# Patient Record
Sex: MALE | Race: WHITE | NOT HISPANIC OR LATINO | Employment: OTHER | URBAN - METROPOLITAN AREA
[De-identification: names, ages, dates, MRNs, and addresses within clinical notes are randomized per-mention and may not be internally consistent; named-entity substitution may affect disease eponyms.]

---

## 2017-01-04 ENCOUNTER — OFFICE VISIT - HEALTHEAST (OUTPATIENT)
Dept: PHYSICAL THERAPY | Facility: REHABILITATION | Age: 57
End: 2017-01-04

## 2017-01-04 DIAGNOSIS — G95.9 CERVICAL MYELOPATHY (H): ICD-10-CM

## 2017-01-04 DIAGNOSIS — M53.86 DECREASED ROM OF LUMBAR SPINE: ICD-10-CM

## 2017-01-04 DIAGNOSIS — M62.81 GENERALIZED MUSCLE WEAKNESS: ICD-10-CM

## 2017-01-04 DIAGNOSIS — R26.81 UNSTEADINESS ON FEET: ICD-10-CM

## 2017-01-04 DIAGNOSIS — M53.82 DECREASED ROM OF INTERVERTEBRAL DISCS OF CERVICAL SPINE: ICD-10-CM

## 2017-01-04 DIAGNOSIS — Z98.1 HISTORY OF FUSION OF CERVICAL SPINE: ICD-10-CM

## 2017-01-04 DIAGNOSIS — M54.12 CERVICAL RADICULOPATHY AT C8: ICD-10-CM

## 2017-01-04 DIAGNOSIS — M54.16 RIGHT LUMBAR RADICULOPATHY: ICD-10-CM

## 2017-01-04 DIAGNOSIS — Z74.09 DECREASED FUNCTIONAL MOBILITY AND ENDURANCE: ICD-10-CM

## 2017-01-06 ENCOUNTER — OFFICE VISIT - HEALTHEAST (OUTPATIENT)
Dept: PHYSICAL THERAPY | Facility: REHABILITATION | Age: 57
End: 2017-01-06

## 2017-01-06 DIAGNOSIS — M62.81 GENERALIZED MUSCLE WEAKNESS: ICD-10-CM

## 2017-01-06 DIAGNOSIS — Z98.1 HISTORY OF FUSION OF CERVICAL SPINE: ICD-10-CM

## 2017-01-06 DIAGNOSIS — Z74.09 DECREASED FUNCTIONAL MOBILITY AND ENDURANCE: ICD-10-CM

## 2017-01-06 DIAGNOSIS — M21.371 RIGHT FOOT DROP: ICD-10-CM

## 2017-01-06 DIAGNOSIS — M53.82 DECREASED ROM OF INTERVERTEBRAL DISCS OF CERVICAL SPINE: ICD-10-CM

## 2017-01-06 DIAGNOSIS — M53.86 DECREASED ROM OF LUMBAR SPINE: ICD-10-CM

## 2017-01-06 DIAGNOSIS — M54.16 RIGHT LUMBAR RADICULOPATHY: ICD-10-CM

## 2017-01-06 DIAGNOSIS — R26.81 UNSTEADINESS ON FEET: ICD-10-CM

## 2017-01-06 DIAGNOSIS — M54.12 CERVICAL RADICULOPATHY AT C8: ICD-10-CM

## 2017-01-06 DIAGNOSIS — G95.9 CERVICAL MYELOPATHY (H): ICD-10-CM

## 2017-01-10 ENCOUNTER — OFFICE VISIT - HEALTHEAST (OUTPATIENT)
Dept: PHYSICAL THERAPY | Facility: REHABILITATION | Age: 57
End: 2017-01-10

## 2017-01-10 DIAGNOSIS — G95.9 CERVICAL MYELOPATHY (H): ICD-10-CM

## 2017-01-10 DIAGNOSIS — R26.81 UNSTEADINESS ON FEET: ICD-10-CM

## 2017-01-10 DIAGNOSIS — M62.81 GENERALIZED MUSCLE WEAKNESS: ICD-10-CM

## 2017-01-10 DIAGNOSIS — M53.82 DECREASED ROM OF INTERVERTEBRAL DISCS OF CERVICAL SPINE: ICD-10-CM

## 2017-01-10 DIAGNOSIS — M21.371 RIGHT FOOT DROP: ICD-10-CM

## 2017-01-10 DIAGNOSIS — Z98.1 HISTORY OF FUSION OF CERVICAL SPINE: ICD-10-CM

## 2017-01-10 DIAGNOSIS — Z74.09 DECREASED FUNCTIONAL MOBILITY AND ENDURANCE: ICD-10-CM

## 2017-01-10 DIAGNOSIS — M54.16 RIGHT LUMBAR RADICULOPATHY: ICD-10-CM

## 2017-01-10 DIAGNOSIS — M53.86 DECREASED ROM OF LUMBAR SPINE: ICD-10-CM

## 2017-01-10 DIAGNOSIS — M54.12 CERVICAL RADICULOPATHY AT C8: ICD-10-CM

## 2017-01-11 ENCOUNTER — HOSPITAL ENCOUNTER (OUTPATIENT)
Dept: PHYSICAL MEDICINE AND REHAB | Facility: CLINIC | Age: 57
Discharge: HOME | End: 2017-01-11
Attending: PHYSICIAN ASSISTANT

## 2017-01-11 DIAGNOSIS — Z98.1 S/P CERVICAL SPINAL FUSION: ICD-10-CM

## 2017-01-11 DIAGNOSIS — M54.16 LUMBAR RADICULOPATHY: ICD-10-CM

## 2017-01-11 DIAGNOSIS — M54.12 CERVICAL RADICULITIS: ICD-10-CM

## 2017-01-11 RX ORDER — CELECOXIB 100 MG/1
100 CAPSULE ORAL 2 TIMES DAILY PRN
Status: SHIPPED | COMMUNITY
Start: 2017-01-02

## 2017-01-17 ENCOUNTER — OFFICE VISIT - HEALTHEAST (OUTPATIENT)
Dept: PHYSICAL THERAPY | Facility: REHABILITATION | Age: 57
End: 2017-01-17

## 2017-01-17 DIAGNOSIS — M54.16 RIGHT LUMBAR RADICULOPATHY: ICD-10-CM

## 2017-01-17 DIAGNOSIS — M62.81 GENERALIZED MUSCLE WEAKNESS: ICD-10-CM

## 2017-01-17 DIAGNOSIS — Z98.1 HISTORY OF FUSION OF CERVICAL SPINE: ICD-10-CM

## 2017-01-17 DIAGNOSIS — Z74.09 DECREASED FUNCTIONAL MOBILITY AND ENDURANCE: ICD-10-CM

## 2017-01-17 DIAGNOSIS — M53.86 DECREASED ROM OF LUMBAR SPINE: ICD-10-CM

## 2017-01-17 DIAGNOSIS — M21.371 RIGHT FOOT DROP: ICD-10-CM

## 2017-01-17 DIAGNOSIS — R26.81 UNSTEADINESS ON FEET: ICD-10-CM

## 2017-01-17 DIAGNOSIS — M53.82 DECREASED ROM OF INTERVERTEBRAL DISCS OF CERVICAL SPINE: ICD-10-CM

## 2017-01-17 DIAGNOSIS — G95.9 CERVICAL MYELOPATHY (H): ICD-10-CM

## 2017-01-17 DIAGNOSIS — M54.12 CERVICAL RADICULOPATHY AT C8: ICD-10-CM

## 2017-01-24 ENCOUNTER — AMBULATORY - HEALTHEAST (OUTPATIENT)
Dept: NEUROSURGERY | Facility: CLINIC | Age: 57
End: 2017-01-24

## 2017-01-24 ENCOUNTER — OFFICE VISIT - HEALTHEAST (OUTPATIENT)
Dept: PHYSICAL THERAPY | Facility: REHABILITATION | Age: 57
End: 2017-01-24

## 2017-01-24 ENCOUNTER — OFFICE VISIT - HEALTHEAST (OUTPATIENT)
Dept: NEUROSURGERY | Facility: CLINIC | Age: 57
End: 2017-01-24

## 2017-01-24 DIAGNOSIS — M53.82 DECREASED ROM OF INTERVERTEBRAL DISCS OF CERVICAL SPINE: ICD-10-CM

## 2017-01-24 DIAGNOSIS — G95.9 CERVICAL MYELOPATHY (H): ICD-10-CM

## 2017-01-24 DIAGNOSIS — M21.371 RIGHT FOOT DROP: ICD-10-CM

## 2017-01-24 DIAGNOSIS — M54.16 RIGHT LUMBAR RADICULOPATHY: ICD-10-CM

## 2017-01-24 DIAGNOSIS — R26.81 UNSTEADINESS ON FEET: ICD-10-CM

## 2017-01-24 DIAGNOSIS — M54.12 CERVICAL RADICULOPATHY AT C8: ICD-10-CM

## 2017-01-24 DIAGNOSIS — M53.86 DECREASED ROM OF LUMBAR SPINE: ICD-10-CM

## 2017-01-24 DIAGNOSIS — Z74.09 DECREASED FUNCTIONAL MOBILITY AND ENDURANCE: ICD-10-CM

## 2017-01-24 DIAGNOSIS — Z98.1 HISTORY OF FUSION OF CERVICAL SPINE: ICD-10-CM

## 2017-01-24 DIAGNOSIS — M62.81 GENERALIZED MUSCLE WEAKNESS: ICD-10-CM

## 2017-01-26 ENCOUNTER — HOSPITAL ENCOUNTER (OUTPATIENT)
Dept: MRI IMAGING | Facility: CLINIC | Age: 57
Discharge: HOME | End: 2017-01-26
Attending: NEUROLOGICAL SURGERY

## 2017-01-26 DIAGNOSIS — M54.16 RIGHT LUMBAR RADICULOPATHY: ICD-10-CM

## 2017-02-01 ENCOUNTER — AMBULATORY - HEALTHEAST (OUTPATIENT)
Dept: NEUROSURGERY | Facility: CLINIC | Age: 57
End: 2017-02-01

## 2017-02-07 ENCOUNTER — COMMUNICATION - HEALTHEAST (OUTPATIENT)
Dept: NEUROSURGERY | Facility: CLINIC | Age: 57
End: 2017-02-07

## 2017-02-10 ENCOUNTER — AMBULATORY - HEALTHEAST (OUTPATIENT)
Dept: NEUROSURGERY | Facility: CLINIC | Age: 57
End: 2017-02-10

## 2017-02-13 ENCOUNTER — AMBULATORY - HEALTHEAST (OUTPATIENT)
Dept: NEUROSURGERY | Facility: CLINIC | Age: 57
End: 2017-02-13

## 2017-02-13 ENCOUNTER — RECORDS - HEALTHEAST (OUTPATIENT)
Dept: ADMINISTRATIVE | Facility: OTHER | Age: 57
End: 2017-02-13

## 2017-02-14 ENCOUNTER — RECORDS - HEALTHEAST (OUTPATIENT)
Dept: ADMINISTRATIVE | Facility: OTHER | Age: 57
End: 2017-02-14

## 2017-02-14 RX ORDER — ACETAMINOPHEN 325 MG/1
325 TABLET ORAL EVERY 6 HOURS PRN
Status: SHIPPED | COMMUNITY
Start: 2017-02-14

## 2017-02-28 ENCOUNTER — COMMUNICATION - HEALTHEAST (OUTPATIENT)
Dept: NEUROSURGERY | Facility: CLINIC | Age: 57
End: 2017-02-28

## 2017-03-03 ENCOUNTER — AMBULATORY - HEALTHEAST (OUTPATIENT)
Dept: NEUROSURGERY | Facility: CLINIC | Age: 57
End: 2017-03-03

## 2017-03-07 ENCOUNTER — ANESTHESIA - HEALTHEAST (OUTPATIENT)
Dept: SURGERY | Facility: CLINIC | Age: 57
End: 2017-03-07

## 2017-03-08 ENCOUNTER — SURGERY - HEALTHEAST (OUTPATIENT)
Dept: SURGERY | Facility: CLINIC | Age: 57
End: 2017-03-08

## 2017-03-08 ENCOUNTER — AMBULATORY - HEALTHEAST (OUTPATIENT)
Dept: NEUROSURGERY | Facility: CLINIC | Age: 57
End: 2017-03-08

## 2017-03-08 ASSESSMENT — MIFFLIN-ST. JEOR: SCORE: 1621.03

## 2017-03-09 ENCOUNTER — COMMUNICATION - HEALTHEAST (OUTPATIENT)
Dept: NEUROSURGERY | Facility: CLINIC | Age: 57
End: 2017-03-09

## 2017-03-16 ENCOUNTER — AMBULATORY - HEALTHEAST (OUTPATIENT)
Dept: NEUROSURGERY | Facility: CLINIC | Age: 57
End: 2017-03-16

## 2017-03-16 DIAGNOSIS — Z51.89 VISIT FOR WOUND CHECK: ICD-10-CM

## 2017-04-21 ENCOUNTER — AMBULATORY - HEALTHEAST (OUTPATIENT)
Dept: NEUROSURGERY | Facility: CLINIC | Age: 57
End: 2017-04-21

## 2017-04-21 ENCOUNTER — OFFICE VISIT - HEALTHEAST (OUTPATIENT)
Dept: NEUROSURGERY | Facility: CLINIC | Age: 57
End: 2017-04-21

## 2017-04-21 DIAGNOSIS — M54.16 LUMBAR NERVE ROOT COMPRESSION: ICD-10-CM

## 2017-04-21 ASSESSMENT — MIFFLIN-ST. JEOR: SCORE: 1620.46

## 2017-04-26 ENCOUNTER — OFFICE VISIT - HEALTHEAST (OUTPATIENT)
Dept: PHYSICAL THERAPY | Facility: REHABILITATION | Age: 57
End: 2017-04-26

## 2017-04-26 DIAGNOSIS — M62.81 GENERALIZED MUSCLE WEAKNESS: ICD-10-CM

## 2017-04-26 DIAGNOSIS — R26.2 DIFFICULTY WALKING: ICD-10-CM

## 2017-04-26 DIAGNOSIS — R26.81 UNSTEADINESS ON FEET: ICD-10-CM

## 2017-04-26 DIAGNOSIS — Z98.890 STATUS POST LUMBAR SPINE OPERATION: ICD-10-CM

## 2017-05-01 ENCOUNTER — OFFICE VISIT - HEALTHEAST (OUTPATIENT)
Dept: PHYSICAL THERAPY | Facility: REHABILITATION | Age: 57
End: 2017-05-01

## 2017-05-01 DIAGNOSIS — M53.82 DECREASED ROM OF INTERVERTEBRAL DISCS OF CERVICAL SPINE: ICD-10-CM

## 2017-05-01 DIAGNOSIS — G95.9 CERVICAL MYELOPATHY (H): ICD-10-CM

## 2017-05-01 DIAGNOSIS — Z98.890 STATUS POST LUMBAR SPINE OPERATION: ICD-10-CM

## 2017-05-01 DIAGNOSIS — M54.12 CERVICAL RADICULOPATHY AT C8: ICD-10-CM

## 2017-05-01 DIAGNOSIS — M62.81 GENERALIZED MUSCLE WEAKNESS: ICD-10-CM

## 2017-05-01 DIAGNOSIS — Z98.1 HISTORY OF FUSION OF CERVICAL SPINE: ICD-10-CM

## 2017-05-01 DIAGNOSIS — R26.2 DIFFICULTY WALKING: ICD-10-CM

## 2017-05-01 DIAGNOSIS — M53.86 DECREASED ROM OF LUMBAR SPINE: ICD-10-CM

## 2017-05-01 DIAGNOSIS — M54.16 RIGHT LUMBAR RADICULOPATHY: ICD-10-CM

## 2017-05-01 DIAGNOSIS — Z74.09 DECREASED FUNCTIONAL MOBILITY AND ENDURANCE: ICD-10-CM

## 2017-05-01 DIAGNOSIS — R26.81 UNSTEADINESS ON FEET: ICD-10-CM

## 2017-05-01 DIAGNOSIS — M21.371 RIGHT FOOT DROP: ICD-10-CM

## 2017-05-04 ENCOUNTER — OFFICE VISIT - HEALTHEAST (OUTPATIENT)
Dept: PHYSICAL THERAPY | Facility: REHABILITATION | Age: 57
End: 2017-05-04

## 2017-05-04 DIAGNOSIS — M62.81 GENERALIZED MUSCLE WEAKNESS: ICD-10-CM

## 2017-05-04 DIAGNOSIS — M53.86 DECREASED ROM OF LUMBAR SPINE: ICD-10-CM

## 2017-05-04 DIAGNOSIS — Z74.09 DECREASED FUNCTIONAL MOBILITY AND ENDURANCE: ICD-10-CM

## 2017-05-04 DIAGNOSIS — Z98.1 HISTORY OF FUSION OF CERVICAL SPINE: ICD-10-CM

## 2017-05-04 DIAGNOSIS — R26.81 UNSTEADINESS ON FEET: ICD-10-CM

## 2017-05-04 DIAGNOSIS — G95.9 CERVICAL MYELOPATHY (H): ICD-10-CM

## 2017-05-04 DIAGNOSIS — M53.82 DECREASED ROM OF INTERVERTEBRAL DISCS OF CERVICAL SPINE: ICD-10-CM

## 2017-05-04 DIAGNOSIS — R26.2 DIFFICULTY WALKING: ICD-10-CM

## 2017-05-04 DIAGNOSIS — Z98.890 STATUS POST LUMBAR SPINE OPERATION: ICD-10-CM

## 2017-05-04 DIAGNOSIS — M54.12 CERVICAL RADICULOPATHY AT C8: ICD-10-CM

## 2017-05-08 ENCOUNTER — OFFICE VISIT - HEALTHEAST (OUTPATIENT)
Dept: PHYSICAL THERAPY | Facility: REHABILITATION | Age: 57
End: 2017-05-08

## 2017-05-08 DIAGNOSIS — M54.12 CERVICAL RADICULOPATHY AT C8: ICD-10-CM

## 2017-05-08 DIAGNOSIS — R26.81 UNSTEADINESS ON FEET: ICD-10-CM

## 2017-05-08 DIAGNOSIS — M53.86 DECREASED ROM OF LUMBAR SPINE: ICD-10-CM

## 2017-05-08 DIAGNOSIS — M62.81 GENERALIZED MUSCLE WEAKNESS: ICD-10-CM

## 2017-05-08 DIAGNOSIS — Z98.890 STATUS POST LUMBAR SPINE OPERATION: ICD-10-CM

## 2017-05-08 DIAGNOSIS — Z74.09 DECREASED FUNCTIONAL MOBILITY AND ENDURANCE: ICD-10-CM

## 2017-05-08 DIAGNOSIS — G95.9 CERVICAL MYELOPATHY (H): ICD-10-CM

## 2017-05-08 DIAGNOSIS — M53.82 DECREASED ROM OF INTERVERTEBRAL DISCS OF CERVICAL SPINE: ICD-10-CM

## 2017-05-08 DIAGNOSIS — R26.2 DIFFICULTY WALKING: ICD-10-CM

## 2017-05-08 DIAGNOSIS — Z98.1 HISTORY OF FUSION OF CERVICAL SPINE: ICD-10-CM

## 2017-05-15 ENCOUNTER — OFFICE VISIT - HEALTHEAST (OUTPATIENT)
Dept: PHYSICAL THERAPY | Facility: REHABILITATION | Age: 57
End: 2017-05-15

## 2017-05-15 DIAGNOSIS — Z98.1 HISTORY OF FUSION OF CERVICAL SPINE: ICD-10-CM

## 2017-05-15 DIAGNOSIS — M53.86 DECREASED ROM OF LUMBAR SPINE: ICD-10-CM

## 2017-05-15 DIAGNOSIS — R26.81 UNSTEADINESS ON FEET: ICD-10-CM

## 2017-05-15 DIAGNOSIS — M54.12 CERVICAL RADICULOPATHY AT C8: ICD-10-CM

## 2017-05-15 DIAGNOSIS — R26.2 DIFFICULTY WALKING: ICD-10-CM

## 2017-05-15 DIAGNOSIS — Z98.890 STATUS POST LUMBAR SPINE OPERATION: ICD-10-CM

## 2017-05-15 DIAGNOSIS — M62.81 GENERALIZED MUSCLE WEAKNESS: ICD-10-CM

## 2017-05-18 ENCOUNTER — OFFICE VISIT - HEALTHEAST (OUTPATIENT)
Dept: PHYSICAL THERAPY | Facility: REHABILITATION | Age: 57
End: 2017-05-18

## 2017-05-18 DIAGNOSIS — R26.81 UNSTEADINESS ON FEET: ICD-10-CM

## 2017-05-18 DIAGNOSIS — Z98.890 STATUS POST LUMBAR SPINE OPERATION: ICD-10-CM

## 2017-05-18 DIAGNOSIS — M53.82 DECREASED ROM OF INTERVERTEBRAL DISCS OF CERVICAL SPINE: ICD-10-CM

## 2017-05-18 DIAGNOSIS — M53.86 DECREASED ROM OF LUMBAR SPINE: ICD-10-CM

## 2017-05-18 DIAGNOSIS — Z98.1 HISTORY OF FUSION OF CERVICAL SPINE: ICD-10-CM

## 2017-05-18 DIAGNOSIS — Z74.09 DECREASED FUNCTIONAL MOBILITY AND ENDURANCE: ICD-10-CM

## 2017-05-18 DIAGNOSIS — M62.81 GENERALIZED MUSCLE WEAKNESS: ICD-10-CM

## 2017-05-18 DIAGNOSIS — M54.12 CERVICAL RADICULOPATHY AT C8: ICD-10-CM

## 2017-05-18 DIAGNOSIS — R26.2 DIFFICULTY WALKING: ICD-10-CM

## 2017-05-22 ENCOUNTER — OFFICE VISIT - HEALTHEAST (OUTPATIENT)
Dept: PHYSICAL THERAPY | Facility: REHABILITATION | Age: 57
End: 2017-05-22

## 2017-05-22 DIAGNOSIS — Z98.890 STATUS POST LUMBAR SPINE OPERATION: ICD-10-CM

## 2017-05-22 DIAGNOSIS — M53.82 DECREASED ROM OF INTERVERTEBRAL DISCS OF CERVICAL SPINE: ICD-10-CM

## 2017-05-22 DIAGNOSIS — R26.81 UNSTEADINESS ON FEET: ICD-10-CM

## 2017-05-22 DIAGNOSIS — M62.81 GENERALIZED MUSCLE WEAKNESS: ICD-10-CM

## 2017-05-22 DIAGNOSIS — M54.12 CERVICAL RADICULOPATHY AT C8: ICD-10-CM

## 2017-05-22 DIAGNOSIS — Z98.1 HISTORY OF FUSION OF CERVICAL SPINE: ICD-10-CM

## 2017-05-22 DIAGNOSIS — Z74.09 DECREASED FUNCTIONAL MOBILITY AND ENDURANCE: ICD-10-CM

## 2017-05-22 DIAGNOSIS — M53.86 DECREASED ROM OF LUMBAR SPINE: ICD-10-CM

## 2017-05-22 DIAGNOSIS — R26.2 DIFFICULTY WALKING: ICD-10-CM

## 2017-05-25 ENCOUNTER — OFFICE VISIT - HEALTHEAST (OUTPATIENT)
Dept: PHYSICAL THERAPY | Facility: REHABILITATION | Age: 57
End: 2017-05-25

## 2017-05-25 DIAGNOSIS — M53.86 DECREASED ROM OF LUMBAR SPINE: ICD-10-CM

## 2017-05-25 DIAGNOSIS — R26.81 UNSTEADINESS ON FEET: ICD-10-CM

## 2017-05-25 DIAGNOSIS — Z98.1 HISTORY OF FUSION OF CERVICAL SPINE: ICD-10-CM

## 2017-05-25 DIAGNOSIS — M62.81 GENERALIZED MUSCLE WEAKNESS: ICD-10-CM

## 2017-05-25 DIAGNOSIS — R26.2 DIFFICULTY WALKING: ICD-10-CM

## 2017-05-25 DIAGNOSIS — M54.12 CERVICAL RADICULOPATHY AT C8: ICD-10-CM

## 2017-05-25 DIAGNOSIS — Z98.890 STATUS POST LUMBAR SPINE OPERATION: ICD-10-CM

## 2017-05-25 DIAGNOSIS — Z74.09 DECREASED FUNCTIONAL MOBILITY AND ENDURANCE: ICD-10-CM

## 2017-05-25 DIAGNOSIS — M53.82 DECREASED ROM OF INTERVERTEBRAL DISCS OF CERVICAL SPINE: ICD-10-CM

## 2017-06-02 ENCOUNTER — OFFICE VISIT - HEALTHEAST (OUTPATIENT)
Dept: PHYSICAL THERAPY | Facility: REHABILITATION | Age: 57
End: 2017-06-02

## 2017-06-02 DIAGNOSIS — M53.86 DECREASED ROM OF LUMBAR SPINE: ICD-10-CM

## 2017-06-02 DIAGNOSIS — Z98.890 STATUS POST LUMBAR SPINE OPERATION: ICD-10-CM

## 2017-06-02 DIAGNOSIS — R26.81 UNSTEADINESS ON FEET: ICD-10-CM

## 2017-06-02 DIAGNOSIS — M62.81 GENERALIZED MUSCLE WEAKNESS: ICD-10-CM

## 2017-06-02 DIAGNOSIS — Z98.1 HISTORY OF FUSION OF CERVICAL SPINE: ICD-10-CM

## 2017-06-02 DIAGNOSIS — Z74.09 DECREASED FUNCTIONAL MOBILITY AND ENDURANCE: ICD-10-CM

## 2017-06-02 DIAGNOSIS — M54.12 CERVICAL RADICULOPATHY AT C8: ICD-10-CM

## 2017-06-02 DIAGNOSIS — R26.2 DIFFICULTY WALKING: ICD-10-CM

## 2017-06-05 ENCOUNTER — OFFICE VISIT - HEALTHEAST (OUTPATIENT)
Dept: PHYSICAL THERAPY | Facility: REHABILITATION | Age: 57
End: 2017-06-05

## 2017-06-05 DIAGNOSIS — M54.16 RIGHT LUMBAR RADICULOPATHY: ICD-10-CM

## 2017-06-05 DIAGNOSIS — Z98.890 STATUS POST LUMBAR SPINE OPERATION: ICD-10-CM

## 2017-06-05 DIAGNOSIS — M53.86 DECREASED ROM OF LUMBAR SPINE: ICD-10-CM

## 2017-06-05 DIAGNOSIS — R26.81 UNSTEADINESS ON FEET: ICD-10-CM

## 2017-06-05 DIAGNOSIS — Z98.1 HISTORY OF FUSION OF CERVICAL SPINE: ICD-10-CM

## 2017-06-05 DIAGNOSIS — M53.82 DECREASED ROM OF INTERVERTEBRAL DISCS OF CERVICAL SPINE: ICD-10-CM

## 2017-06-05 DIAGNOSIS — M62.81 GENERALIZED MUSCLE WEAKNESS: ICD-10-CM

## 2017-06-05 DIAGNOSIS — R26.2 DIFFICULTY WALKING: ICD-10-CM

## 2017-06-05 DIAGNOSIS — M54.12 CERVICAL RADICULOPATHY AT C8: ICD-10-CM

## 2017-06-05 DIAGNOSIS — Z74.09 DECREASED FUNCTIONAL MOBILITY AND ENDURANCE: ICD-10-CM

## 2017-06-08 ENCOUNTER — OFFICE VISIT - HEALTHEAST (OUTPATIENT)
Dept: PHYSICAL THERAPY | Facility: REHABILITATION | Age: 57
End: 2017-06-08

## 2017-06-08 DIAGNOSIS — M54.12 CERVICAL RADICULOPATHY AT C8: ICD-10-CM

## 2017-06-08 DIAGNOSIS — G95.9 CERVICAL MYELOPATHY (H): ICD-10-CM

## 2017-06-08 DIAGNOSIS — R26.81 UNSTEADINESS ON FEET: ICD-10-CM

## 2017-06-08 DIAGNOSIS — M62.81 GENERALIZED MUSCLE WEAKNESS: ICD-10-CM

## 2017-06-08 DIAGNOSIS — M54.16 RIGHT LUMBAR RADICULOPATHY: ICD-10-CM

## 2017-06-08 DIAGNOSIS — R26.2 DIFFICULTY WALKING: ICD-10-CM

## 2017-06-08 DIAGNOSIS — M53.82 DECREASED ROM OF INTERVERTEBRAL DISCS OF CERVICAL SPINE: ICD-10-CM

## 2017-06-08 DIAGNOSIS — Z74.09 DECREASED FUNCTIONAL MOBILITY AND ENDURANCE: ICD-10-CM

## 2017-06-08 DIAGNOSIS — M53.86 DECREASED ROM OF LUMBAR SPINE: ICD-10-CM

## 2017-06-08 DIAGNOSIS — Z98.1 HISTORY OF FUSION OF CERVICAL SPINE: ICD-10-CM

## 2017-06-08 DIAGNOSIS — Z98.890 STATUS POST LUMBAR SPINE OPERATION: ICD-10-CM

## 2017-06-12 ENCOUNTER — OFFICE VISIT - HEALTHEAST (OUTPATIENT)
Dept: PHYSICAL THERAPY | Facility: REHABILITATION | Age: 57
End: 2017-06-12

## 2017-06-12 DIAGNOSIS — Z98.890 STATUS POST LUMBAR SPINE OPERATION: ICD-10-CM

## 2017-06-12 DIAGNOSIS — M54.12 CERVICAL RADICULOPATHY AT C8: ICD-10-CM

## 2017-06-12 DIAGNOSIS — Z74.09 DECREASED FUNCTIONAL MOBILITY AND ENDURANCE: ICD-10-CM

## 2017-06-12 DIAGNOSIS — M53.82 DECREASED ROM OF INTERVERTEBRAL DISCS OF CERVICAL SPINE: ICD-10-CM

## 2017-06-12 DIAGNOSIS — R26.2 DIFFICULTY WALKING: ICD-10-CM

## 2017-06-12 DIAGNOSIS — R26.81 UNSTEADINESS ON FEET: ICD-10-CM

## 2017-06-12 DIAGNOSIS — M53.86 DECREASED ROM OF LUMBAR SPINE: ICD-10-CM

## 2017-06-12 DIAGNOSIS — M62.81 GENERALIZED MUSCLE WEAKNESS: ICD-10-CM

## 2017-06-12 DIAGNOSIS — Z98.1 HISTORY OF FUSION OF CERVICAL SPINE: ICD-10-CM

## 2017-06-14 ENCOUNTER — AMBULATORY - HEALTHEAST (OUTPATIENT)
Dept: NEUROSURGERY | Facility: CLINIC | Age: 57
End: 2017-06-14

## 2017-06-14 DIAGNOSIS — M48.062 NEUROGENIC CLAUDICATION DUE TO LUMBAR SPINAL STENOSIS: ICD-10-CM

## 2017-08-01 ENCOUNTER — COMMUNICATION - HEALTHEAST (OUTPATIENT)
Dept: PHYSICAL THERAPY | Facility: REHABILITATION | Age: 57
End: 2017-08-01

## 2017-08-31 ENCOUNTER — RECORDS - HEALTHEAST (OUTPATIENT)
Dept: ADMINISTRATIVE | Facility: OTHER | Age: 57
End: 2017-08-31

## 2017-09-05 ENCOUNTER — HOSPITAL ENCOUNTER (OUTPATIENT)
Dept: NUCLEAR MEDICINE | Facility: HOSPITAL | Age: 57
Discharge: HOME | End: 2017-09-05
Attending: ORTHOPAEDIC SURGERY

## 2017-09-05 DIAGNOSIS — M25.559 HIP PAIN: ICD-10-CM

## 2017-10-30 ENCOUNTER — RECORDS - HEALTHEAST (OUTPATIENT)
Dept: RADIOLOGY | Facility: CLINIC | Age: 57
End: 2017-10-30

## 2017-10-30 ENCOUNTER — RECORDS - HEALTHEAST (OUTPATIENT)
Dept: ADMINISTRATIVE | Facility: OTHER | Age: 57
End: 2017-10-30

## 2017-10-30 ENCOUNTER — AMBULATORY - HEALTHEAST (OUTPATIENT)
Dept: NEUROSURGERY | Facility: CLINIC | Age: 57
End: 2017-10-30

## 2018-01-08 ENCOUNTER — RECORDS - HEALTHEAST (OUTPATIENT)
Dept: ADMINISTRATIVE | Facility: OTHER | Age: 58
End: 2018-01-08

## 2018-01-16 ENCOUNTER — RECORDS - HEALTHEAST (OUTPATIENT)
Dept: ADMINISTRATIVE | Facility: OTHER | Age: 58
End: 2018-01-16

## 2018-01-19 ENCOUNTER — RECORDS - HEALTHEAST (OUTPATIENT)
Dept: ADMINISTRATIVE | Facility: OTHER | Age: 58
End: 2018-01-19

## 2018-03-23 ENCOUNTER — COMMUNICATION - HEALTHEAST (OUTPATIENT)
Dept: TELEHEALTH | Facility: CLINIC | Age: 58
End: 2018-03-23

## 2018-03-23 ENCOUNTER — OFFICE VISIT - HEALTHEAST (OUTPATIENT)
Dept: NEUROSURGERY | Facility: CLINIC | Age: 58
End: 2018-03-23

## 2018-03-23 DIAGNOSIS — M54.50 LOW BACK PAIN POTENTIALLY ASSOCIATED WITH RADICULOPATHY: ICD-10-CM

## 2018-04-11 ENCOUNTER — COMMUNICATION - HEALTHEAST (OUTPATIENT)
Dept: NEUROSURGERY | Facility: CLINIC | Age: 58
End: 2018-04-11

## 2018-05-03 ENCOUNTER — OFFICE VISIT - HEALTHEAST (OUTPATIENT)
Dept: NEUROSURGERY | Facility: CLINIC | Age: 58
End: 2018-05-03

## 2018-05-03 DIAGNOSIS — G89.29 CHRONIC HEADACHES: ICD-10-CM

## 2018-05-03 DIAGNOSIS — M54.2 NECK PAIN: ICD-10-CM

## 2018-05-03 DIAGNOSIS — R51.9 CHRONIC HEADACHES: ICD-10-CM

## 2018-05-03 RX ORDER — CHLORAL HYDRATE 500 MG
2 CAPSULE ORAL DAILY
Status: SHIPPED | COMMUNITY
Start: 2018-05-03

## 2018-05-03 ASSESSMENT — MIFFLIN-ST. JEOR: SCORE: 1620.46

## 2018-05-10 ENCOUNTER — HOSPITAL ENCOUNTER (OUTPATIENT)
Dept: PHYSICAL MEDICINE AND REHAB | Facility: CLINIC | Age: 58
Discharge: HOME | End: 2018-05-10
Attending: NEUROLOGICAL SURGERY

## 2018-05-10 DIAGNOSIS — R29.898 WEAKNESS OF RIGHT UPPER EXTREMITY: ICD-10-CM

## 2018-05-10 DIAGNOSIS — R51.9 HEADACHE: ICD-10-CM

## 2018-05-10 DIAGNOSIS — R42 DIZZINESS: ICD-10-CM

## 2018-05-10 DIAGNOSIS — M54.12 CERVICAL RADICULITIS: ICD-10-CM

## 2018-05-10 DIAGNOSIS — Z98.1 S/P CERVICAL SPINAL FUSION: ICD-10-CM

## 2018-05-10 DIAGNOSIS — F40.240 CLAUSTROPHOBIA: ICD-10-CM

## 2018-05-10 DIAGNOSIS — R20.0 RIGHT FACIAL NUMBNESS: ICD-10-CM

## 2018-05-10 RX ORDER — HYDROCODONE BITARTRATE AND ACETAMINOPHEN 5; 325 MG/1; MG/1
TABLET ORAL
Refills: 0 | Status: SHIPPED | COMMUNITY
Start: 2018-03-30

## 2018-05-10 RX ORDER — MAGNESIUM HYDROXIDE 400 MG/5ML
SUSPENSION, ORAL (FINAL DOSE FORM) ORAL
Status: SHIPPED | COMMUNITY
Start: 2018-04-26

## 2018-05-16 ENCOUNTER — HOSPITAL ENCOUNTER (OUTPATIENT)
Dept: MRI IMAGING | Facility: CLINIC | Age: 58
Discharge: HOME | End: 2018-05-16
Attending: PHYSICIAN ASSISTANT

## 2018-05-16 DIAGNOSIS — R20.0 RIGHT FACIAL NUMBNESS: ICD-10-CM

## 2018-05-16 DIAGNOSIS — R42 DIZZINESS: ICD-10-CM

## 2018-05-16 DIAGNOSIS — R29.898 WEAKNESS OF RIGHT UPPER EXTREMITY: ICD-10-CM

## 2018-05-16 DIAGNOSIS — R51.9 HEADACHE: ICD-10-CM

## 2018-05-17 ENCOUNTER — COMMUNICATION - HEALTHEAST (OUTPATIENT)
Dept: PHYSICAL MEDICINE AND REHAB | Facility: CLINIC | Age: 58
End: 2018-05-17

## 2018-05-17 DIAGNOSIS — M54.2 NECK PAIN: ICD-10-CM

## 2018-05-18 ENCOUNTER — HOSPITAL ENCOUNTER (OUTPATIENT)
Dept: PHYSICAL MEDICINE AND REHAB | Facility: CLINIC | Age: 58
Discharge: HOME | End: 2018-05-18
Attending: PHYSICIAN ASSISTANT

## 2018-05-18 DIAGNOSIS — M54.2 NECK PAIN: ICD-10-CM

## 2018-05-29 ENCOUNTER — AMBULATORY - HEALTHEAST (OUTPATIENT)
Dept: PHYSICAL MEDICINE AND REHAB | Facility: CLINIC | Age: 58
End: 2018-05-29

## 2018-05-29 DIAGNOSIS — M47.812 CERVICAL FACET JOINT SYNDROME: ICD-10-CM

## 2018-06-01 ENCOUNTER — HOSPITAL ENCOUNTER (OUTPATIENT)
Dept: PHYSICAL MEDICINE AND REHAB | Facility: CLINIC | Age: 58
Discharge: HOME | End: 2018-06-01
Attending: PHYSICIAN ASSISTANT

## 2018-06-01 DIAGNOSIS — M47.812 CERVICAL FACET JOINT SYNDROME: ICD-10-CM

## 2018-06-01 DIAGNOSIS — M12.88 OTHER SPECIFIC ARTHROPATHIES, NOT ELSEWHERE CLASSIFIED, OTHER SPECIFIED SITE: ICD-10-CM

## 2018-06-15 ENCOUNTER — HOSPITAL ENCOUNTER (OUTPATIENT)
Dept: PHYSICAL MEDICINE AND REHAB | Facility: CLINIC | Age: 58
Discharge: HOME | End: 2018-06-15
Attending: PHYSICIAN ASSISTANT

## 2018-06-15 DIAGNOSIS — M47.812 CERVICAL FACET JOINT SYNDROME: ICD-10-CM

## 2018-06-15 DIAGNOSIS — Z98.1 S/P CERVICAL SPINAL FUSION: ICD-10-CM

## 2018-06-15 DIAGNOSIS — M54.81 OCCIPITAL NEURALGIA OF RIGHT SIDE: ICD-10-CM

## 2018-06-15 DIAGNOSIS — M54.12 CERVICAL RADICULITIS: ICD-10-CM

## 2018-06-29 ENCOUNTER — HOSPITAL ENCOUNTER (OUTPATIENT)
Dept: PHYSICAL MEDICINE AND REHAB | Facility: CLINIC | Age: 58
Discharge: HOME | End: 2018-06-29
Attending: PHYSICIAN ASSISTANT

## 2018-06-29 DIAGNOSIS — M54.2 NECK PAIN: ICD-10-CM

## 2018-06-29 DIAGNOSIS — M47.812 CERVICAL FACET JOINT SYNDROME: ICD-10-CM

## 2018-06-29 DIAGNOSIS — M79.18 MYOFASCIAL PAIN: ICD-10-CM

## 2018-06-29 DIAGNOSIS — R20.0 RIGHT FACIAL NUMBNESS: ICD-10-CM

## 2018-06-29 DIAGNOSIS — Z98.1 S/P CERVICAL SPINAL FUSION: ICD-10-CM

## 2018-07-13 ENCOUNTER — HOSPITAL ENCOUNTER (OUTPATIENT)
Dept: PHYSICAL MEDICINE AND REHAB | Facility: CLINIC | Age: 58
Discharge: HOME | End: 2018-07-13
Attending: PHYSICIAN ASSISTANT

## 2018-07-13 DIAGNOSIS — Z98.1 S/P CERVICAL SPINAL FUSION: ICD-10-CM

## 2018-07-13 DIAGNOSIS — M79.18 MYOFASCIAL PAIN: ICD-10-CM

## 2018-07-13 DIAGNOSIS — M54.12 CERVICAL RADICULITIS: ICD-10-CM

## 2018-07-13 DIAGNOSIS — M47.812 CERVICAL FACET JOINT SYNDROME: ICD-10-CM

## 2018-07-13 DIAGNOSIS — M54.2 NECK PAIN: ICD-10-CM

## 2018-07-17 ENCOUNTER — RECORDS - HEALTHEAST (OUTPATIENT)
Dept: ADMINISTRATIVE | Facility: OTHER | Age: 58
End: 2018-07-17

## 2018-07-20 ENCOUNTER — OFFICE VISIT - HEALTHEAST (OUTPATIENT)
Dept: NEUROSURGERY | Facility: CLINIC | Age: 58
End: 2018-07-20

## 2018-07-20 DIAGNOSIS — M54.2 NECK PAIN: ICD-10-CM

## 2018-07-20 ASSESSMENT — MIFFLIN-ST. JEOR: SCORE: 1617.06

## 2018-07-23 ENCOUNTER — HOSPITAL ENCOUNTER (OUTPATIENT)
Dept: CT IMAGING | Facility: CLINIC | Age: 58
Discharge: HOME | End: 2018-07-23
Attending: SURGERY

## 2018-07-23 ENCOUNTER — HOSPITAL ENCOUNTER (OUTPATIENT)
Dept: RADIOLOGY | Facility: CLINIC | Age: 58
Discharge: HOME | End: 2018-07-23
Attending: SURGERY

## 2018-07-23 DIAGNOSIS — M54.2 NECK PAIN: ICD-10-CM

## 2018-08-08 ENCOUNTER — OFFICE VISIT - HEALTHEAST (OUTPATIENT)
Dept: NEUROSURGERY | Facility: CLINIC | Age: 58
End: 2018-08-08

## 2018-08-08 DIAGNOSIS — M48.02 NEURAL FORAMINAL STENOSIS OF CERVICAL SPINE: ICD-10-CM

## 2018-08-08 ASSESSMENT — MIFFLIN-ST. JEOR: SCORE: 1617.06

## 2018-08-09 ENCOUNTER — OFFICE VISIT - HEALTHEAST (OUTPATIENT)
Dept: PHYSICAL THERAPY | Facility: REHABILITATION | Age: 58
End: 2018-08-09

## 2018-08-09 DIAGNOSIS — R29.3 POOR POSTURE: ICD-10-CM

## 2018-08-09 DIAGNOSIS — M62.81 GENERALIZED MUSCLE WEAKNESS: ICD-10-CM

## 2018-08-09 DIAGNOSIS — M54.12 RIGHT CERVICAL RADICULOPATHY: ICD-10-CM

## 2018-08-15 ENCOUNTER — OFFICE VISIT - HEALTHEAST (OUTPATIENT)
Dept: PHYSICAL THERAPY | Facility: REHABILITATION | Age: 58
End: 2018-08-15

## 2018-08-15 DIAGNOSIS — R29.3 POOR POSTURE: ICD-10-CM

## 2018-08-15 DIAGNOSIS — R26.81 UNSTEADINESS ON FEET: ICD-10-CM

## 2018-08-15 DIAGNOSIS — M62.81 GENERALIZED MUSCLE WEAKNESS: ICD-10-CM

## 2018-08-15 DIAGNOSIS — R26.2 DIFFICULTY WALKING: ICD-10-CM

## 2018-08-15 DIAGNOSIS — Z98.890 STATUS POST LUMBAR SPINE OPERATION: ICD-10-CM

## 2018-08-15 DIAGNOSIS — M54.12 RIGHT CERVICAL RADICULOPATHY: ICD-10-CM

## 2018-08-15 DIAGNOSIS — M54.12 CERVICAL RADICULOPATHY AT C8: ICD-10-CM

## 2018-08-17 ENCOUNTER — OFFICE VISIT - HEALTHEAST (OUTPATIENT)
Dept: PHYSICAL THERAPY | Facility: REHABILITATION | Age: 58
End: 2018-08-17

## 2018-08-17 DIAGNOSIS — M62.81 GENERALIZED MUSCLE WEAKNESS: ICD-10-CM

## 2018-08-17 DIAGNOSIS — M54.12 RIGHT CERVICAL RADICULOPATHY: ICD-10-CM

## 2018-08-17 DIAGNOSIS — R29.3 POOR POSTURE: ICD-10-CM

## 2018-08-22 ENCOUNTER — OFFICE VISIT - HEALTHEAST (OUTPATIENT)
Dept: PHYSICAL THERAPY | Facility: REHABILITATION | Age: 58
End: 2018-08-22

## 2018-08-22 DIAGNOSIS — M54.12 RIGHT CERVICAL RADICULOPATHY: ICD-10-CM

## 2018-08-22 DIAGNOSIS — R29.3 POOR POSTURE: ICD-10-CM

## 2018-08-22 DIAGNOSIS — M62.81 GENERALIZED MUSCLE WEAKNESS: ICD-10-CM

## 2018-08-24 ENCOUNTER — OFFICE VISIT - HEALTHEAST (OUTPATIENT)
Dept: PHYSICAL THERAPY | Facility: REHABILITATION | Age: 58
End: 2018-08-24

## 2018-08-24 DIAGNOSIS — M54.12 RIGHT CERVICAL RADICULOPATHY: ICD-10-CM

## 2018-08-24 DIAGNOSIS — M62.81 GENERALIZED MUSCLE WEAKNESS: ICD-10-CM

## 2018-08-24 DIAGNOSIS — R29.3 POOR POSTURE: ICD-10-CM

## 2018-08-27 ENCOUNTER — HOSPITAL ENCOUNTER (OUTPATIENT)
Dept: PHYSICAL MEDICINE AND REHAB | Facility: CLINIC | Age: 58
Discharge: HOME | End: 2018-08-27
Attending: SURGERY

## 2018-08-27 ENCOUNTER — COMMUNICATION - HEALTHEAST (OUTPATIENT)
Dept: PHYSICAL MEDICINE AND REHAB | Facility: CLINIC | Age: 58
End: 2018-08-27

## 2018-08-27 DIAGNOSIS — M54.12 CERVICAL RADICULITIS: ICD-10-CM

## 2018-08-27 DIAGNOSIS — R51.9 HEADACHE: ICD-10-CM

## 2018-08-27 DIAGNOSIS — M47.812 CERVICAL FACET JOINT SYNDROME: ICD-10-CM

## 2018-08-27 DIAGNOSIS — M54.2 NECK PAIN: ICD-10-CM

## 2018-08-27 DIAGNOSIS — Z98.1 S/P CERVICAL SPINAL FUSION: ICD-10-CM

## 2018-08-27 RX ORDER — OXYCODONE AND ACETAMINOPHEN 5; 325 MG/1; MG/1
1 TABLET ORAL DAILY
Status: SHIPPED | COMMUNITY
Start: 2018-08-13

## 2018-08-29 ENCOUNTER — OFFICE VISIT - HEALTHEAST (OUTPATIENT)
Dept: PHYSICAL THERAPY | Facility: REHABILITATION | Age: 58
End: 2018-08-29

## 2018-08-29 DIAGNOSIS — Z98.1 HISTORY OF FUSION OF CERVICAL SPINE: ICD-10-CM

## 2018-08-29 DIAGNOSIS — R26.81 UNSTEADINESS ON FEET: ICD-10-CM

## 2018-08-29 DIAGNOSIS — Z98.890 STATUS POST LUMBAR SPINE OPERATION: ICD-10-CM

## 2018-08-29 DIAGNOSIS — M54.12 RIGHT CERVICAL RADICULOPATHY: ICD-10-CM

## 2018-08-29 DIAGNOSIS — R26.2 DIFFICULTY WALKING: ICD-10-CM

## 2018-08-29 DIAGNOSIS — R29.3 POOR POSTURE: ICD-10-CM

## 2018-08-29 DIAGNOSIS — M62.81 GENERALIZED MUSCLE WEAKNESS: ICD-10-CM

## 2018-08-29 DIAGNOSIS — M54.12 CERVICAL RADICULOPATHY AT C8: ICD-10-CM

## 2018-09-07 ENCOUNTER — HOSPITAL ENCOUNTER (OUTPATIENT)
Dept: PHYSICAL MEDICINE AND REHAB | Facility: CLINIC | Age: 58
Discharge: HOME | End: 2018-09-07
Attending: PHYSICIAN ASSISTANT

## 2018-09-07 DIAGNOSIS — M54.2 NECK PAIN: ICD-10-CM

## 2018-09-07 RX ORDER — ATORVASTATIN CALCIUM 20 MG/1
20 TABLET, FILM COATED ORAL
Status: SHIPPED | COMMUNITY
Start: 2018-09-05

## 2018-09-12 ENCOUNTER — OFFICE VISIT - HEALTHEAST (OUTPATIENT)
Dept: PHYSICAL THERAPY | Facility: REHABILITATION | Age: 58
End: 2018-09-12

## 2018-09-12 DIAGNOSIS — R29.3 POOR POSTURE: ICD-10-CM

## 2018-09-12 DIAGNOSIS — R26.2 DIFFICULTY WALKING: ICD-10-CM

## 2018-09-12 DIAGNOSIS — M54.12 RIGHT CERVICAL RADICULOPATHY: ICD-10-CM

## 2018-09-12 DIAGNOSIS — M62.81 GENERALIZED MUSCLE WEAKNESS: ICD-10-CM

## 2018-09-12 DIAGNOSIS — Z98.890 STATUS POST LUMBAR SPINE OPERATION: ICD-10-CM

## 2018-09-19 ENCOUNTER — OFFICE VISIT - HEALTHEAST (OUTPATIENT)
Dept: PHYSICAL THERAPY | Facility: REHABILITATION | Age: 58
End: 2018-09-19

## 2018-09-19 DIAGNOSIS — M62.81 GENERALIZED MUSCLE WEAKNESS: ICD-10-CM

## 2018-09-19 DIAGNOSIS — R29.3 POOR POSTURE: ICD-10-CM

## 2018-09-19 DIAGNOSIS — M54.12 RIGHT CERVICAL RADICULOPATHY: ICD-10-CM

## 2018-09-21 ENCOUNTER — HOSPITAL ENCOUNTER (OUTPATIENT)
Dept: PHYSICAL MEDICINE AND REHAB | Facility: CLINIC | Age: 58
Discharge: HOME | End: 2018-09-21
Attending: PHYSICIAN ASSISTANT

## 2018-09-21 DIAGNOSIS — M54.12 CERVICAL RADICULITIS: ICD-10-CM

## 2018-09-21 DIAGNOSIS — Z98.1 S/P CERVICAL SPINAL FUSION: ICD-10-CM

## 2021-05-28 ENCOUNTER — RECORDS - HEALTHEAST (OUTPATIENT)
Dept: ADMINISTRATIVE | Facility: CLINIC | Age: 61
End: 2021-05-28

## 2021-05-30 VITALS — WEIGHT: 188 LBS | BODY MASS INDEX: 27.76 KG/M2

## 2021-05-30 VITALS — WEIGHT: 189 LBS | WEIGHT: 189 LBS | BODY MASS INDEX: 27.91 KG/M2 | BODY MASS INDEX: 27.91 KG/M2

## 2021-05-30 VITALS — HEIGHT: 69 IN | BODY MASS INDEX: 27.11 KG/M2 | WEIGHT: 183 LBS

## 2021-05-30 VITALS — HEIGHT: 69 IN | WEIGHT: 183.13 LBS | BODY MASS INDEX: 27.12 KG/M2

## 2021-05-30 VITALS — BODY MASS INDEX: 27.76 KG/M2 | HEIGHT: 69 IN | BODY MASS INDEX: 27.76 KG/M2 | HEIGHT: 69 IN

## 2021-06-01 VITALS — WEIGHT: 192 LBS | BODY MASS INDEX: 28.35 KG/M2

## 2021-06-01 VITALS — BODY MASS INDEX: 28.35 KG/M2 | WEIGHT: 192 LBS

## 2021-06-01 VITALS — BODY MASS INDEX: 27.57 KG/M2 | WEIGHT: 184 LBS

## 2021-06-01 VITALS — HEIGHT: 69 IN | WEIGHT: 184 LBS | BODY MASS INDEX: 27.25 KG/M2

## 2021-06-01 VITALS — WEIGHT: 183 LBS | HEIGHT: 69 IN | BODY MASS INDEX: 27.11 KG/M2

## 2021-06-01 VITALS — BODY MASS INDEX: 27.17 KG/M2 | WEIGHT: 184 LBS

## 2021-06-01 VITALS — WEIGHT: 184 LBS | BODY MASS INDEX: 27.17 KG/M2

## 2021-06-02 VITALS — BODY MASS INDEX: 27.57 KG/M2 | WEIGHT: 184 LBS

## 2021-06-08 NOTE — PROGRESS NOTES
[facility] Daily Progress/Discharge summary     Patient Name: Liban Nava  Date: 2/7/2017  Visit #: 6  PTA visit #:    Referral Diagnosis: lumbar radiculopathy, cervical radiculitis, s/p cervical spinal fusion, backache  Referring provider: Gia Conn PA-C  Visit Diagnosis:     ICD-10-CM    1. Cervical myelopathy G95.9    2. Cervical radiculopathy at C8 M54.12    3. History of fusion of cervical spine Z98.1    4. Decreased ROM of lumbar spine M25.60    5. Generalized muscle weakness M62.81    6. Decreased ROM of intervertebral discs of cervical spine M53.82    7. Unsteadiness on feet R26.81    8. Decreased functional mobility and endurance Z74.09    9. Right lumbar radiculopathy M54.16    10. Right foot drop M21.371          Assessment:   Patient has made significant progress in PT with his neck pain and radicular symptoms into R UE. He has decreased pain and neural tension, which has increased in R UE and cervical mobility. However, he still has radicular symptoms into 4th-5th digits and neck pain that limits his ROM. He has worked with some nerve glides in his R LE, which have improved. However, his pain in his low back and into his leg remains unchanged. Patient is scheduled for a lumbar operation with Dr. Gutierrez on 2/15/17. Patient is now discharged from PT and will need a new order to resume in the future.    HEP/POC compliance is  good .  Patient demonstrates understanding/independence with home program.  Patient is benefitting from skilled physical therapy and is making steady progress toward functional goals.    Goal Status:  Pt. will demonstrate/verbalize independence in self-management of condition in : 6 weeks;Not Met (patient having surgery)  Pt. will be independent with home exercise program in : 2 weeks;Met  Patient will decrease : JAVIER score;by _ points;for improved quality of function;in 6 weeks;Not Met  by ___ points: 6  Pt will: have decreased NDI by 5 points in order to improve  "QOL within 8 weeks. (GOAL MET)  Pt will: have decreased radicular symptoms into R UE within 8 weeks in order to improve muscle strength. (GOAL PARTIALLY MET)  Pt will: have decreased radicular symptoms into R LE within 8 weeks in order to improve muscle strength. (GOAL NOT MET)    Plan / Patient Education:     Continue with initial plan of care.  Progress with home program as tolerated.    Exercises:  Exercise #1: R LE leg extended ankle pumps (nerve glides)  Comment #1: x20 (patient unable to lie supine for this exs)  Exercise #2: seated median nerve glides without head turn (45 degrees of abduction)  Comment #2: x20  Exercise #3: doorway pec stretch  Comment #3: 30\" holds  Exercise #4: R scalene and UT stretch without overpressure  Comment #4: 30\" holds each  Exercise #5: abd sets in sitting     Subjective:     Pain Ratin/10 low and leg. 4-5/10 in neck/arm    Injured low back this weekend while moving a toolbox in the back of son's car. Arm is getting much better. Seeing Dr. Gutierrez today. Had wife work on 1st rib at home which seems to really help. Compliant with HEP. Still having radicular symptoms into R UE. Feels that he's made about 40% progress in neck/R arm. LBP/leg pain feel the same.      Objective:   Using FWW into clinic today  R shoulder AROM:   Flexion- 95 degrees, limited by pain   Abduction- 90 degrees, limited by pain   IR- WFL  Cervical ext, SB, and rotation to the R increase neck and arm pain  Improved posture, but still poor  Tender R 1st rib mob  Positive radial and median neural tension  Significant improvement in R LE nerve glides; able to do full knee extension now  R cervical nerve glide to about 95 degrees with head turn  Modified Oswestry Low Back Pain Disablity Questionnaire  in %: 56  Neck Disability Score in %: 60    Treatment Today     TREATMENT MINUTES COMMENTS   Evaluation     Self-care/ Home management     Manual therapy 12 1st rib mob in seated position, Gr I-II. Showed patient " how to have his wife and/or children help with this at home.   Neuromuscular Re-education     Therapeutic Activity     Therapeutic Exercises 11 Discussed progress. Reviewed HEP. Objective measures taken    Gait training     Modality__________________                Total 23    Blank areas are intentional and mean the treatment did not include these items.       Cathy Brown, PT, DPT  2/7/2017

## 2021-06-08 NOTE — PROGRESS NOTES
"[facility] Daily Progress     Patient Name: Liban Nava  Date: 1/6/2017  Visit #: 3  PTA visit #:    Referral Diagnosis: lumbar radiculopathy, cervical radiculitis, s/p cervical spinal fusion, backache  Referring provider: Gia Conn PA-C  Visit Diagnosis:     ICD-10-CM    1. Cervical myelopathy G95.9    2. Cervical radiculopathy at C8 M54.12    3. History of fusion of cervical spine Z98.1    4. Generalized muscle weakness M62.81    5. Decreased ROM of lumbar spine M25.60    6. Decreased ROM of intervertebral discs of cervical spine M53.82    7. Unsteadiness on feet R26.81    8. Decreased functional mobility and endurance Z74.09    9. Right lumbar radiculopathy M54.16    10. Right foot drop M21.371          Assessment:     HEP/POC compliance is  good .  Patient demonstrates understanding/independence with home program.  Patient is benefitting from skilled physical therapy and is making steady progress toward functional goals.    Goal Status:  Pt. will demonstrate/verbalize independence in self-management of condition in : 6 weeks  Pt. will be independent with home exercise program in : 2 weeks  Patient will decrease : JAVIER score;by _ points;for improved quality of function;in 6 weeks  by ___ points: 6  Pt will: have decreased NDI by 5 points in order to improve QOL within 8 weeks.  Pt will: have decreased radicular symptoms into R UE within 8 weeks in order to improve muscle strength.  Pt will: have decreased radicular symptoms into R LE within 8 weeks in order to improve muscle strength.    Plan / Patient Education:     Continue with initial plan of care.  Progress with home program as tolerated.    Exercises:  Exercise #1: R LE leg extended ankle pumps (nerve glides)  Comment #1: x20 (patient unable to lie supine for this exs)  Exercise #2: seated median nerve glides without head turn (45 degrees of abduction)  Comment #2: x20  Exercise #3: doorway pec stretch  Comment #3: 30\" holds  Exercise " "#4: R scalene and UT stretch without overpressure  Comment #4: 30\" holds each  Exercise #5: abd sets in sitting     Plan for next visit: collect JAVIER, progress nerve glides if able. 1st rib mob, check HEP. gua sha tools to pec and R UT?  Subjective:     Pain Ratin /10 with use of tramadol    Thinks the stretches helped a little bit and provided some relief. Was able to sleep a little bit last night. Still having pain while turning head to the R. Able to progress nerve glides to about 80 degrees of abduction.       Objective:   Using FWW into clinic today  Poor posture  R shoulder protracted forward  Tender R 1st rib mob  Some change in parathesias     Treatment Today     TREATMENT MINUTES COMMENTS   Evaluation     Self-care/ Home management     Manual therapy 8 1st rib mob in seated position, Gr I-II   Neuromuscular Re-education     Therapeutic Activity     Therapeutic Exercises 15 Discussed progress. Progressed cervical and R LE nerve glides. Added abd set in sitting   Gait training     Modality__________________                Total 23    Blank areas are intentional and mean the treatment did not include these items.       Cathy Brown, PT, DPT  2017    "

## 2021-06-08 NOTE — PROGRESS NOTES
"[facility] Daily Progress     Patient Name: Liban Nava  Date: 1/10/2017  Visit #: 4  PTA visit #:    Referral Diagnosis: lumbar radiculopathy, cervical radiculitis, s/p cervical spinal fusion, backache  Referring provider: iGa Conn PA-C  Visit Diagnosis:     ICD-10-CM    1. Cervical myelopathy G95.9    2. Cervical radiculopathy at C8 M54.12    3. History of fusion of cervical spine Z98.1    4. Generalized muscle weakness M62.81    5. Decreased ROM of lumbar spine M25.60    6. Decreased ROM of intervertebral discs of cervical spine M53.82    7. Unsteadiness on feet R26.81    8. Decreased functional mobility and endurance Z74.09    9. Right lumbar radiculopathy M54.16    10. Right foot drop M21.371          Assessment:     HEP/POC compliance is  good .  Patient demonstrates understanding/independence with home program.  Patient is benefitting from skilled physical therapy and is making steady progress toward functional goals.    Goal Status:  Pt. will demonstrate/verbalize independence in self-management of condition in : 6 weeks  Pt. will be independent with home exercise program in : 2 weeks  Patient will decrease : JAVIER score;by _ points;for improved quality of function;in 6 weeks  by ___ points: 6  Pt will: have decreased NDI by 5 points in order to improve QOL within 8 weeks.  Pt will: have decreased radicular symptoms into R UE within 8 weeks in order to improve muscle strength.  Pt will: have decreased radicular symptoms into R LE within 8 weeks in order to improve muscle strength.    Plan / Patient Education:     Continue with initial plan of care.  Progress with home program as tolerated.    Exercises:  Exercise #1: R LE leg extended ankle pumps (nerve glides)  Comment #1: x20 (patient unable to lie supine for this exs)  Exercise #2: seated median nerve glides without head turn (45 degrees of abduction)  Comment #2: x20  Exercise #3: doorway pec stretch  Comment #3: 30\" " "holds  Exercise #4: R scalene and UT stretch without overpressure  Comment #4: 30\" holds each  Exercise #5: abd sets in sitting     Plan for next visit: progress nerve glides if able. 1st rib mob, check HEP. gua sha tools to pec and R UT?  Subjective:     Pain Ratin/10 with use of tramadol    Yesterday was pretty bad. Tough time sleeping over the weekend. Thinks that the increased pain today from sitting and waiting for his oil to get changed might be why he has increased pain. Isn't sure why he was flared up over the weekend. Doesn't think PT is the reason he has increased pain. The tingling into R UE is almost always constant. Feels about 30% better since beginning PT      Objective:   Using FWW into clinic today  Poor posture  R shoulder protracted forward  Tender R 1st rib mob  Positive radial and median neuro tension  Significant improvement in R LE nerve glides; able to do full knee extension now  R cervical nerve glide to about 80 degrees with slight head turn    Treatment Today     TREATMENT MINUTES COMMENTS   Evaluation     Self-care/ Home management     Manual therapy 8 1st rib mob in seated position, Gr I-II   Neuromuscular Re-education     Therapeutic Activity     Therapeutic Exercises 15 Discussed progress. Progressed cervical nerve glides with head turn. Added  Added radial nerve glides (passing ball around back) and radial nerve glides with R wrist in ext and slight cervical rotation to the R with shoulder extension   Gait training     Modality__________________                Total 23    Blank areas are intentional and mean the treatment did not include these items.       Cathy Brown, PT, DPT  1/10/2017    "

## 2021-06-08 NOTE — PROGRESS NOTES
"Chief complaint: Low back and right leg pain with weakness    HPI:  Pain: low back pain  Radicular Pain is present: from right buttock, down the back of right leg to knee, down the lateral lower leg to foot along Y interpret to be L5.  Motor complaints: right leg is weak  Sensory complaints: tingling in right leg  Gait and balance issues: gait and balance disturbance, uses walker  Bowel or bladder issues: denies  Duration of SX is: 1 year +  The symptoms are worse with: sitting, walking, standing  The symptoms are better with: laying down, ice  Injury: no  Severity is: moderate  Patient has tried the following conservative measures: PT pool tx, injection 12/2016 with minimal relief    Past Medical History   Diagnosis Date     Back pain with radiation      to legs     BPH (benign prostatic hyperplasia)      Cervical spondylosis      Degenerative disc disease, lumbar      Degenerative joint disease      GERD (gastroesophageal reflux disease)      Hypertension      not currently on meds     Lumbar spondylosis      Past Surgical History   Procedure Laterality Date     Cervical fusion  2000, 2004, 2014     Hernia repair       Appendectomy       Cholecystectomy       Nose surgery       polypectomy     Leg surgery Bilateral      to correct \"bowed legs\"     C3-6 laminoplasty       Sinus surgery       Lumbar laminectomy Bilateral 6/12/2014     L3 L4 laminectomy for stenosis, bilateral L34 medial facetectomy for lateral recess decompression, and bilateral L34 foraminotomy on 6/12/2014 by Dr. Sabrina Gutierrez.     Laminoplasty Bilateral 5/19/2014     C3-5 laminoplasty with C6 partial laminectomy on 5/19/2014 by Dr. Sabrina Gutierrez for cervical myeloplathy     Current Outpatient Prescriptions on File Prior to Visit   Medication Sig Dispense Refill     ASCORBATE CALCIUM (VITAMIN C ORAL) Take 500 mg by mouth daily.        aspirin 81 MG tablet Take 81 mg by mouth daily.       calcium carbonate (OS-DANELLE) 600 mg (1,500 mg) tablet Take " 600 mg by mouth daily.       celecoxib (CELEBREX) 100 MG capsule Take by mouth.       cyclobenzaprine (FLEXERIL) 10 MG tablet Take 10 mg by mouth 3 (three) times a day as needed for muscle spasms.       gabapentin (NEURONTIN) 300 MG capsule Take 300 mg by mouth 3 (three) times a day.       methocarbamol (ROBAXIN) 500 MG tablet Take 500 mg by mouth 3 (three) times a day as needed.       MULTIVITAMIN (MULTIPLE VITAMIN ORAL) Take 1 tablet by mouth daily.       omega-3 fatty acids-vitamin E (FISH OIL) 1,000 mg cap Take 1 capsule by mouth.       ranitidine (ZANTAC) 150 MG tablet Take 150 mg by mouth daily.       TAMSULOSIN HCL (TAMSULOSIN ORAL) Take 0.4 mg by mouth daily.       traMADol (ULTRAM) 50 mg tablet Take 50 mg by mouth.       oxyCODONE (ROXICODONE) 5 MG immediate release tablet Take 5-10 mg by mouth.       No current facility-administered medications on file prior to visit.      Allergies:  Cat dander    12 point review of systems is positive for recent hip surgery.  He still feels a bit unsteady on his feet.  Personal history of arthritis.  The neurological review as above.  Otherwise, the review is negative.    No pertinent family history.    Exam:  Visit Vitals     BP (!) 169/94     Pulse 75     Resp 20         Mental status: Alert and oriented, mood and affect appropriate, language reception and expression normal, recent and remote memory is normal, higher cortical function normal. Attention span, concentration and ability to follow commands is normal.    Nerves II through XII are grossly intact.    Motor with weakness of the right tibialis anterior.  Sensory changes in the distal right L5    Lumbar MRI shows lateral recess stenosis at L4-5 and foraminal stenosis at L5-S1 on the right side consistent with his clinical syndrome.    Assessment: Intractable right L5 radiculopathy with motor and sensory findings.    Plan: I have recommended decompression of the right L5 nerve root.  I reviewed the risk of  infection, bleeding and persistent nerve pain or worsening.  I also needed new MRI because the last one is 8 months old.  He appeared to understand the discussion and would like to proceed.    Sabrina Gutierrez MD, FACS, FAANS    Cc Roman Ramos MD

## 2021-06-08 NOTE — PROGRESS NOTES
"[facility] Daily Progress     Patient Name: Liban Nava  Date: 1/5/2017  Visit #: 2  PTA visit #:    Referral Diagnosis: lumbar radiculopathy, cervical radiculitis, s/p cervical spinal fusion, backache  Referring provider: Gia Conn PA-C  Visit Diagnosis:     ICD-10-CM    1. Cervical myelopathy G95.9    2. Cervical radiculopathy at C8 M54.12    3. History of fusion of cervical spine Z98.1    4. Generalized muscle weakness M62.81    5. Decreased ROM of lumbar spine M25.60    6. Decreased ROM of intervertebral discs of cervical spine M53.82    7. Unsteadiness on feet R26.81    8. Decreased functional mobility and endurance Z74.09    9. Right lumbar radiculopathy M54.16          Assessment:     HEP/POC compliance is  good .  Patient demonstrates understanding/independence with home program.  Patient is benefitting from skilled physical therapy and is making steady progress toward functional goals.    Goal Status:  Pt. will demonstrate/verbalize independence in self-management of condition in : 6 weeks  Pt. will be independent with home exercise program in : 2 weeks  Patient will decrease : JAVIER score;by _ points;for improved quality of function;in 6 weeks  by ___ points: 6  Pt will: have decreased NDI by 5 points in order to improve QOL within 8 weeks.  Pt will: have decreased radicular symptoms into R UE within 8 weeks in order to improve muscle strength.  Pt will: have decreased radicular symptoms into R LE within 8 weeks in order to improve muscle strength.    Plan / Patient Education:     Continue with initial plan of care.  Progress with home program as tolerated.    Exercises:  Exercise #1: R LE leg extended ankle pumps (nerve glides)  Comment #1: x20 (patient unable to lie supine for this exs)  Exercise #2: seated median nerve glides without head turn (45 degrees of abduction)  Comment #2: x20  Exercise #3: doorway pec stretch  Comment #3: 30\" holds  Exercise #4: R scalene and UT stretch " "without overpressure  Comment #4: 30\" holds each     Plan for next visit: collect JAVIER, progress nerve glides if able. 1st rib mob, check HEP. glute and piriformis stretches  Subjective:     Pain Ratin /10 with use of tramadol    Compliant with HEP. Did use ice and tried eliminating heat. Ice makes it feel stiff. Pain is overall the same in his back and his neck.       Objective:     Poor posture  R shoulder protracted forward  Tender R 1st rib mob  Some change in parathesias     Treatment Today     TREATMENT MINUTES COMMENTS   Evaluation     Self-care/ Home management     Manual therapy 8 1st rib mob in seated position, Gr I-II   Neuromuscular Re-education     Therapeutic Activity     Therapeutic Exercises 17 Discussed progress. Added scalene and UT stretches. Reviewed nerve glides; not ready to progress yet due to pain. Recommend use of FWW until recovered from low back surgery.   Gait training     Modality__________________                Total 25    Blank areas are intentional and mean the treatment did not include these items.       Cathy Brown, PT, DPT  2017    "

## 2021-06-08 NOTE — PROGRESS NOTES
[facility] Daily Progress     Patient Name: Liban Nava  Date: 1/17/2017  Visit #: 5  PTA visit #:    Referral Diagnosis: lumbar radiculopathy, cervical radiculitis, s/p cervical spinal fusion, backache  Referring provider: Gia Conn PA-C  Visit Diagnosis:     ICD-10-CM    1. Cervical myelopathy G95.9    2. Cervical radiculopathy at C8 M54.12    3. History of fusion of cervical spine Z98.1    4. Generalized muscle weakness M62.81    5. Decreased ROM of lumbar spine M25.60    6. Decreased ROM of intervertebral discs of cervical spine M53.82    7. Unsteadiness on feet R26.81    8. Decreased functional mobility and endurance Z74.09    9. Right lumbar radiculopathy M54.16    10. Right foot drop M21.371          Assessment:   Patient is making progress in therapy towards his goals. He is able to perform UE and LE nerve glides much easier now. He is also able to move his head better and is having less neural tension. The symptoms into his R UE are intermittent rather than constant. Patient is compliant and motivated which is why he is progressing as well as he is.    HEP/POC compliance is  good .  Patient demonstrates understanding/independence with home program.  Patient is benefitting from skilled physical therapy and is making steady progress toward functional goals.    Goal Status:  Pt. will demonstrate/verbalize independence in self-management of condition in : 6 weeks  Pt. will be independent with home exercise program in : 2 weeks  Patient will decrease : JAVIER score;by _ points;for improved quality of function;in 6 weeks  by ___ points: 6  Pt will: have decreased NDI by 5 points in order to improve QOL within 8 weeks.  Pt will: have decreased radicular symptoms into R UE within 8 weeks in order to improve muscle strength.  Pt will: have decreased radicular symptoms into R LE within 8 weeks in order to improve muscle strength.    Plan / Patient Education:     Continue with initial plan of  "care.  Progress with home program as tolerated.    Exercises:  Exercise #1: R LE leg extended ankle pumps (nerve glides)  Comment #1: x20 (patient unable to lie supine for this exs)  Exercise #2: seated median nerve glides without head turn (45 degrees of abduction)  Comment #2: x20  Exercise #3: doorway pec stretch  Comment #3: 30\" holds  Exercise #4: R scalene and UT stretch without overpressure  Comment #4: 30\" holds each  Exercise #5: abd sets in sitting     Plan for next visit: progress nerve glides if able. 1st rib mob, check HEP. eva sha tools to pec and R UT?  Subjective:     Pain Ratin/10 low and leg. 4/10 in neck     Seeing Dr. Gutierrez on 16. Thinks he's doing better. Gia thinks that he'll probably need surgery for his low back. Compliant with HEP. Laying on R side at night still bothersome. Tingling into R hand is now intermittent. Notices tingling when he tries to use R UE        Objective:   Using FWW into clinic today  R shoulder AROM:   Flexion- 110 degrees, limited by pain   Abduction- 110 degrees, limited by pain   IR- WFL  Improved posture, but still poor  Tender R 1st rib mob  Positive radial and median neural tension  Significant improvement in R LE nerve glides; able to do full knee extension now  R cervical nerve glide to about 95 degrees with head turn    Treatment Today     TREATMENT MINUTES COMMENTS   Evaluation     Self-care/ Home management     Manual therapy 10 1st rib mob in seated position, Gr I-II. Showed patient how to have his wife and/or children help with this at home.   Neuromuscular Re-education     Therapeutic Activity     Therapeutic Exercises 10 Discussed progress. Progressed cervical nerve glides with head turn.    Gait training     Modality__________________                Total 20    Blank areas are intentional and mean the treatment did not include these items.       Cathy Brown, PT, DPT  2017    "

## 2021-06-08 NOTE — PROGRESS NOTES
Assessment:   Liban Nava is a 56 y.o. y.o. male with past medical history significant for GERD, hypertension who presents today for follow-up regarding neck pain with radiation into the right upper extremity with associated numbness, tingling, and weakness in a C8 distribution.  The patient has a history of cervical fusion from C4-C7 and a cervical laminoplasty C3-C5.  The patient severe right C7-T1 foraminal stenosis with impingement of the right C8 nerve root.  The patient is status post a C7-T1 interlaminar epidural steroid injection on December 29, 2016 which has provided 30% relief of his pain.  The patient also has a right L5 radiculopathy.  Dr. Gutierrez has recommended surgery for this, but wanted his neck pain to be under better control first.  The patient now feels that his neck pain is improved enough that he would be able to tolerate a low back surgery.         Plan:     A shared decision making plan was used.  The patient's values and choices were respected.  The following represents what was discussed and decided upon by the physician assistant and the patient.      1.  DIAGNOSTIC TESTS:  I reviewed the MRI of the cervical and lumbar spine.  I also reviewed the EMG of the right lower extremity.  No further diagnostic tests were ordered.    2.  PHYSICAL THERAPY: The patient is currently in physical therapy at the [facility].  He feels that this is helpful.  He is doing his home exercises.  I encouraged him to continue doing so.    3.  MEDICATIONS:  No changes are made to the patient's medications.  He uses tramadol 50 mg 3-4 times per day, oxycodone 5 mg every other day, gabapentin 900 mg 3 times daily, Celebrex 100 mg twice daily, and cycle benzocaine as needed.  These medications are managed by his primary care provider.    4.  INTERVENTIONS:  No further interventions were ordered.  If the patient's neck pain were to worsen again, we could repeat the C7-T1 interlaminar  epidural steroid injection.  Of note, a right C7-T1 transforaminal epidural steroid injection was attempted at an outside facility and was unsuccessful due to the severity of the stenosis.    5.  PATIENT EDUCATION:  The patient's neck pain is under better control and he is getting treatment for it, I think it would be reasonable for him to follow back up with Dr. Gutierrez to discuss back surgery further.  In her most recent note, she does say that he be a candidate for right L4-5 decompression.  We will call over to the neurosurgery clinic to assist with scheduling a follow-up visit with Dr. Gutierrez.    6.  FOLLOW-UP: The patient can follow-up with me as needed.  We will await Dr. Gutierrez's recommendations.  If you have any questions or concerns, he should not hesitate to call.    Subjective:     Liban Nava is a 56 y.o. male who presents today for follow-up regarding neck pain with radiation into the right upper extremity with associated numbness, tingling, and weakness.  The patient is status post a C7-T1 intralaminar epidural steroid injection on December 29, 2016.  The patient presents that has provided 30% relief of his pain.  He has also had 3 sessions of physical therapy so far which she feels is an official.    The patient continues to complain of right-sided neck pain.  The pain radiates into the right shoulder, into the right axilla, and extends down the triceps and into the ulnar forearm, ending in the fourth and fifth digits of the right hand.  He has numbness and tingling in the same distribution as his pain.  He feels the right arm is weak.  The patient states that he is no longer having any of the sharp, shooting pain that he was having before his injection.  He feels that his pain is now much more manageable.  He also states that the range of motion in his neck has improved significantly.  He rates his pain today as a 6 out of 10.  At best it is a 5 out of 10.  At its worst it is an 8 or 10.  His pain  tends to be aggravated with moving his neck, especially to the right, although this is significantly improved than before his injection.  It is alleviated doing his physical therapy exercises.    The patient denies any change in his low back or right leg pain/numbness and tingling.  He continues to have weakness in the right ankle.  He was wearing an AFO.    As mentioned above, patient is currently in physical therapy.  He is doing his home exercises. He uses tramadol 50 mg 3-4 times per day, oxycodone 5 mg every other day, gabapentin 900 mg 3 times daily, Celebrex 100 mg twice daily, and cycle benzocaine as needed.  These medications are managed by his primary care provider.    Past medical history is unchanged in the interim.    Family history is beaten is unchanged in the interim.    Review of Systems:  Positive for numbness/tingling, foot drop, weakness, headache, blurry vision, balance changes.  Negative for loss of bowel/bladder control, dizziness, nausea/vomiting.     Objective:   CONSTITUTIONAL:  Vital signs as above.  No acute distress.  The patient is well nourished and well groomed.    PSYCHIATRIC:  The patient is awake, alert, oriented to person, place and time.  The patient is answering questions appropriately with clear speech.  Normal affect.  HEENT: Normocephalic, atraumatic.  Sclera clear.  Neck is supple.  SKIN:  Skin over the face, neck bilateral upper extremities is clean, dry, intact without rashes.  MUSCULOSKELETAL: Gait is very antalgic, favoring the right.  He is using a walker for assistance.  4/5 strength in the right upper extremity throughout. 5/5 strength in the left upper extremity throughout. The patient has a right AFO in place. He has a right footdrop. He has 4+/5 strength in the right hip flexors and 4/5 strength in the right knee flexors/extensors. Strength is 5/5 in the left lower extremity throughout.  NEUROLOGICAL:  CN III-XII are grossly intact. 1+ symmetric biceps,  brachioradialis, triceps reflexes bilaterally.  2+ bilateral patellar reflexes, diminished bilateral Achilles reflexes.  Sensation to light touch is subjectively diminished in the right fourth and fifth fingers. Sensation light touch is also subjectively diminished/altered in the right L5 dermatome.  Negative Cross's bilaterally. No ankle clonus.    RESULTS:    MRI of the cervical spine from [facility] dated December 8, 2016 was reviewed. There are postoperative changes at the C4-C7 solid interbody fusion and postoperative changes at the C3-5 laminoplasty. There is adequate spinal canal patency throughout the cervical spine. There is severe disc degeneration at C3-4. There is severe foraminal stenosis bilaterally at C3-4, on the right at C4-5, and on the right at C7-T1. There is also foraminal stenosis bilaterally at T1-2 in 2-3.     [facility] of the lumbar spine [facility] dated May 18, 2016 was reviewed. There are postoperative changes of a decompression at L3-4. There is no high-grade central canal stenosis. There are disc ossified complex is from L1-2 through L5-S1 with lateral recess stenosis bilaterally with potential impingement of the traversing nerve roots at both L4-5 and L5-S1. There are varying degrees of foraminal stenosis at all levels. This is most advanced at L5-S1 where there is severe left and moderate to severe right foraminal stenosis. There is also moderate to severe foraminal stenosis on the right at L3-4. Please see report for further details.     EMG right lower extremity from October 31, 2016:     Interpretation: Abnormal study:      1. Increased insertional activity/denervation potentials of the right tibialis anterior muscle. In isolation, this is nonspecific. Under the correct clinical condition, this can be seen with an active right L4 versus L5 radiculopathy.       2. There is no electrodiagnostic evidence of lumbosacral plexopathy or focal neuropathy in the right lower  extremity. Specifically, there is no electrodiagnostic evidence of right peroneal neuropathy at the knee.

## 2021-06-08 NOTE — PROGRESS NOTES
Pain: low back pain  Radicular Pain is present: from right buttock, down the back of right leg to knee, down the lateral lower leg to foot  Motor complaints: right leg is weak  Sensory complaints: tingling in right leg  Gait and balance issues: gait and balance disturbance, uses walker  Bowel or bladder issues: denies  Duration of SX is: 1 year +  The symptoms are worse with: sitting, walking, standing  The symptoms are better with: laying down, ice  Injury: no  Severity is: moderate  Patient has tried the following conservative measures: PT pool tx, injection 12/2016 with minimal relief  JAVIER score is: 56%  Roxie Tolentino RN

## 2021-06-09 NOTE — ANESTHESIA POSTPROCEDURE EVALUATION
Patient: Liban Nava  RIGHT L5 ROOT DECOMPRESSION  Anesthesia type: general    Patient location: PACU  Last vitals:   Vitals:    03/08/17 1015   BP: 120/73   Pulse: 68   Resp: 12   Temp:    SpO2: 100%     Post vital signs: stable  Level of consciousness: awake and responds to simple questions  Post-anesthesia pain: pain controlled  Post-anesthesia nausea and vomiting: no  Pulmonary: unassisted, return to baseline  Cardiovascular: stable and blood pressure at baseline  Hydration: adequate  Anesthetic events: no    QCDR Measures:  ASA# 11 - Susan-op Cardiac Arrest: ASA11B - Patient did NOT experience unanticipated cardiac arrest  ASA# 12 - Susan-op Mortality Rate: ASA12B - Patient did NOT die  ASA# 13 - PACU Re-Intubation Rate: ASA13B - Patient did NOT require a new airway mgmt  ASA# 10 - Composite Anes Safety: ASA10A - No serious adverse event  ASA# 38 - New Corneal Injury: ASA38A - No new exposure keratitis or corneal abrasion in PACU    Additional Notes:

## 2021-06-09 NOTE — PROGRESS NOTES
Liban Nava is status post L4-5 hemilaminectomy, medial facetectomy and proximal foraminotomy , Right L5-S1 foraminotomy on 3/8/2017 by Dr. Sabrina Gutierrez.  Preoperatively presented with right lower extremity radiculopathy including pain, numbness and weakness worse with positional irritation.  Today he is here for a wound check. He is doing reasonably well - reports a minimal back pain well controlled with Tramadol. Denies leg pain, sensory or motor disturbance in LE. Uses a cane due to chronic imbalance.      Surgical wound WNL - CDI, no signs of infection or skin breakdown.  Incision well-healed: good skin approximation, no redness, but a small visible/palpable edema, no tenderness to palpation.  PT. AF, denies fever, chills or sweats.  Pt. reports that the symptoms are improved from pre-op.

## 2021-06-09 NOTE — ANESTHESIA CARE TRANSFER NOTE
Last vitals:   Vitals:    03/08/17 0957   BP: 128/75   Pulse: 72   Resp: 16   Temp: 36.6  C (97.8  F)   SpO2: (!) 10%     Patient's level of consciousness is drowsy  Spontaneous respirations: yes  Maintains airway independently: yes  Dentition unchanged: yes  Oropharynx: oropharynx clear of all foreign objects    QCDR Measures:  ASA# 20 - Surgical Safety Checklist: ASA20A - Safety Checks Done  PQRS# 430 - Adult PONV Prevention: 4558F - Pt received => 2 anti-emetic agents (different classes) preop & intraop  ASA# 8 - Peds PONV Prevention: NA - Not pediatric patient, not GA or 2 or more risk factors NOT present  PQRS# 424 - Susan-op Temp Management: 4559F - At least one body temp DOCUMENTED => 35.5C or 95.9F within required timeframe  PQRS# 426 - PACU Transfer Protocol: - Transfer of care checklist used  ASA# 14 - Acute Post-op Pain: ASA14B - Patient did NOT experience pain >= 7 out of 10    I completed my SBAR handoff to the receiving nurse per policy and procedure.

## 2021-06-09 NOTE — ANESTHESIA PREPROCEDURE EVALUATION
Anesthesia Evaluation      No history of anesthetic complications (patient reported no history of anesthesia problems on interview DOS)     Airway   Mallampati: II  Neck ROM: limited   Pulmonary - normal exam   (-) asthma, shortness of breath, recent URI, sleep apnea                         Cardiovascular - normal exam  Exercise tolerance: > or = 4 METS  (+) hypertension, ,     (-) angina  ECG reviewed        Neuro/Psych    (+) neuromuscular disease,  chronic pain  (-) no CVA    Endo/Other    (+) arthritis,   (-) no diabetes     GI/Hepatic/Renal    (+) GERD well controlled and intermittent,        Other findings: hgb 12.7      Dental    (+) caps, chipped and poor dentition                       Anesthesia Plan  Planned anesthetic: general endotracheal  Neutral neck position, glidescope intubation  Magnesium gtt  precedex gtt    ASA 2   Induction: intravenous   Anesthetic plan and risks discussed with: patient  Anesthesia plan special considerations: antiemetics,   Post-op plan: routine recovery

## 2021-06-10 NOTE — PROGRESS NOTES
[facility] Certification Request    April 27, 2017      Patient: Liban Nava  MR Number: [id number]  YOB: 1960  Date of Visit: 4/26/2017      Dear Michelle Miranda,    Thank you for this referral.   We are seeing Liban Nava for Physical Therapy s/p R L5 nerve root decompression.    Medicare and/or Medicaid requires physician review and approval of the treatment plan. Please review the plan of care and verify that you agree with the therapy plan of care by co-signing this note.      Plan of Care  Authorization / Certification Start Date: 04/27/17  Authorization / Certification End Date: 07/27/17  Authorization / Certification Number of Visits: 12  Patient Related Instruction: Nature of Condition;Treatment plan and rationale;Self Care instruction;Basis of treatment;Precautions;Next steps;Expected outcome;Body mechanics  Times per Week: 1-2  Number of Weeks: 6-8  Number of Visits: 12  Therapeutic Exercise: ROM;Stretching;Strengthening  Neuromuscular Reeducation: posture;core  Manual Therapy: myofascial release;joint mobilization  Gait Training: w/o AD  Equipment: theraband    Goals:  Pt. will demonstrate/verbalize independence in self-management of condition in : 6 weeks  Pt. will be independent with home exercise program in : 6 weeks  Pt. will have improved quality of sleep: with less pain;waking less times/night;in 4 weeks  Patient will stand : 30 minutes;with less pain;with less difficultty;for home chores;in 6 weeks  Patient will decrease : JAVIER score;by _ points;for improved quality of function;in 6 weeks;for improved quality of life  by ___ points: 6  Pt will: have decreased NDI by 5 points in order to improve QOL within 8 weeks. (GOAL MET)  Pt will: have decreased radicular symptoms into R UE within 8 weeks in order to improve muscle strength. (GOAL PARTIALLY MET)  Pt will: have decreased radicular symptoms into R LE within 8 weeks in order to improve muscle strength. (GOAL NOT  "MET)      If you have any questions or concerns, please don't hesitate to call.    Sincerely,      Cathy Brown, PT        Physician recommendation:     _X__ Follow therapist's recommendation        ___ Modify therapy      *Physician co-signature indicates they certify the need for these services furnished within this plan and while under their care.        [facility]   Post-op Initial Evaluation    Patient Name: Liban Nava  Date of evaluation: 4/27/2017  Referral Diagnosis: Lumbar nerve root compression  Referring provider: Michelle Miranda CNP  Visit Diagnosis:     ICD-10-CM    1. Status post lumbar spine operation Z98.890    2. Generalized muscle weakness M62.81    3. Unsteadiness on feet R26.81    4. Difficulty walking R26.2        Assessment:      Liban Nava is a 56 y.o. male who presents to therapy today s/p R L5 nerve root decompression on 3/8/17. He has a history of chronic low back pain.  Patient has a PMH that is positive for chronic neck pain, GERD and secondary HTN. Difficulty with standing, sleeping, socializing, traveling, homemaking, lifting, walking, and sitting due to pain, weakness, poor balance, and decreased AROM.  Pain symptoms are improving since the operation.  Patient appears motivated for physical therapy and is appropriate for skilled therapy services.    Goals:       Patient's expectations/goals are realistic.    Barriers to Learning or Achieving Goals:  Chronicity of the problem.       Plan / Patient Instructions:      Exercises:  Exercise #1: glute and piriformis stretch (with opp leg bent)  Comment #1: 30\" holds, B with use of belt  Exercise #2: supine nerve glides with use of belt  Comment #2: x20 B  Exercise #3: abd set  Comment #3: x10 with 5\" holds     Plan of Care:   Authorization / Certification Start Date: 04/27/17  Authorization / Certification End Date: 07/27/17  Authorization / Certification Number of Visits: 12  Patient Related Instruction: Nature " "of Condition;Treatment plan and rationale;Self Care instruction;Basis of treatment;Precautions;Next steps;Expected outcome;Body mechanics  Times per Week: 1-2  Number of Weeks: 6-8  Number of Visits: 12  Therapeutic Exercise: ROM;Stretching;Strengthening  Neuromuscular Reeducation: posture;core  Manual Therapy: myofascial release;joint mobilization  Gait Training: w/o AD  Equipment: theraband    Plan for next visit: review HEP as needed, nolberto, progress abd sets as able with leg ext, add sit to stands to HEP, SLR B, Tinnetti     Subjective:         Social information:   Occupation:unemployed   Work Status:NA   Equipment Available: SE cane    History of Present Illness:    Liban is a 56 y.o. male who presents to therapy today s/p R L5 root decompression on 3/8/17 . Patient has had a chronic history of LBP. Symptoms are getting better. He reports  a constant  history of similar symptoms. He describes their previous level of function as limited with most activities due to pain, numbness, and weakness. No longer having radicular symptoms into R LE since surgery. However, he does have weakness. Does feel that his balance is off. Is taking Tramadol and Neurontin since operation. Is going to try [facility] to get back to his own pool rehab next week. Has tried to do some walking since his surgery. Sleeping part of the time in bed, and part of the time in his recliner (was sleeping in the recliner 100% before surgery).      Pain Ratin-6/10  Pain rating at best: \"tolerable\"  Pain rating at worst: 7  Pain description: sharp \"like a knife\", weakness    Functional limitations are described as occurring with: standing, sleeping, socializing, traveling, homemaking, lifting, walking    Patient reports benefit from:  heat, cold, lying down       Objective:      Patient Outcome Measures :    No Data Recorded   Scores range from 0-100%, where a score of 0% represents minimal pain and maximal function. The minimal " clinically important difference is a score reduction of 12%.    Examination  1. Status post lumbar spine operation     2. Generalized muscle weakness     3. Unsteadiness on feet     4. Difficulty walking       Involved side: Bilateral  Posture Observation:      General sitting posture is  fair.  General standing posture is poor.    Gait: using SEC, intermittent decreased L foot clearance  B LE strength:   R hip strength: 3+/5   L hip strength: 4-/5   R knee: 4-/5   L knee: 4/5    Sensation: intact per subjective  Slump: negative on R, positive on L  Sit to stand: 9.5x/30 seconds without use of hands   Patient able to lie supine with knees bent (wasn't able to do so before surgery)  Very tight glute and piriformis  Stairs: reciprocal pattern ascending and descending  +SLR on R  -SLR on L      Treatment Today     TREATMENT MINUTES COMMENTS   Evaluation 15    Self-care/ Home management     Manual therapy     Neuromuscular Re-education     Therapeutic Activity     Therapeutic Exercises 28 Discussed PT POC and pathology of condition. Began HEP- see flow sheet. Answered patient questions.   Gait training     Modality__________________                Total 43    Blank areas are intentional and mean the treatment did not include these items.     PT Evaluation Code: (Please list factors)  Patient History/Comorbidities: chronic neck and low back pain, secondary HTN, GERD  Examination: post-op L5 R nerve decompression  Clinical Presentation: stable  Clinical Decision Making: low    Patient History/  Comorbidities Examination  (body structures and functions, activity limitations, and/or participation restrictions) Clinical Presentation Clinical Decision Making (Complexity)   No documented Comorbidities or personal factors 1-2 Elements Stable and/or uncomplicated Low   1-2 documented comorbidities or personal factor 3 Elements Evolving clinical presentation with changing characteristics Moderate   3-4 documented comorbidities or  personal factors 4 or more Unstable and unpredictable High            Leylae Indreljake, PT, DPT  4/27/2017  8:06 AM

## 2021-06-10 NOTE — PROGRESS NOTES
CHART NOTE     DATE OF SERVICE:  2017     : 1960     ASSESSMENT :   Doing well with improvement in symptoms and function.       PLAN:  Wean from collar/brace. Loosen restrictions. Refer to PT. RTC prn. Enc to call with any new questions or concerns.     HPI:  Liban Nava is status post L4-5 hemilaminectomy, medial facetectomy and proximal foraminotomy, Right L5-S1 foraminotomy on 3/8/2017 by Dr. Sabrina Gutierrez.  Preoperatively presented with right lower extremity radiculopathy including pain, numbness and weakness worse with positional irritation.     TODAY, iLban Nava reports RLE pain is much improved.  Still with some LBP mainly with prolonged ambulation. Also with standing after prolonged sitting, improves with activity. No N/T in legs. Ambulates with cane for now for stability.  Does not work.     PAST MEDICAL HISTORY, SURGICAL HISTORY, REVIEW OF SYMPTOMS, MEDICATIONS AND ALLERGIES:  Past medical history, surgical history, review of symptoms, medications and allergies remain unchanged.    Past Medical History:   Diagnosis Date     Anemia      Back pain with radiation     to legs     BPH (benign prostatic hyperplasia)      Cervical spondylosis      Degenerative disc disease, lumbar      Degenerative joint disease      GERD (gastroesophageal reflux disease)      History of anesthesia complications     C3-6 fusion     Hypertension     not currently on meds     Lumbar spondylosis      Neck pain      Vitamin D deficiency        PHYSICAL EXAM:      Neurological exam reveals:  Recent and remote memory intact, fund of knowledge wnl.    Alert and oriented x3, speech fluent and appropriate.   PERRL, EOMI, No nystagmus, Face symmetric, tongue midline, Shoulder shrug equal  Leg strength bilateral dorsiflexion, plantar flexion, and hip flexion 5/5  Can heel/toe walk, do toe rises and squats without difficulty. R leg sl week with squat.    Muscle Bulk and tone wnl.   Reflexes:   No pathological reflexes    Gait and station:stable, guarded  Incision: CDI without erythema or edema  NDI/JAVIER: 52%     RADIOGRAPHIC IMAGING: NA

## 2021-06-10 NOTE — PROGRESS NOTES
"[facility] Daily Progress     Patient Name: Liban Nava  Date: 5/4/2017  Visit #: 3  PTA visit #:    Referral Diagnosis: Lumbar nerve root compression  Referring provider: Michelle Miranda CNP  Visit Diagnosis:     ICD-10-CM    1. Status post lumbar spine operation Z98.890    2. Generalized muscle weakness M62.81    3. Unsteadiness on feet R26.81    4. Difficulty walking R26.2    5. Cervical myelopathy G95.9    6. Cervical radiculopathy at C8 M54.12    7. History of fusion of cervical spine Z98.1    8. Decreased ROM of lumbar spine M25.60    9. Decreased ROM of intervertebral discs of cervical spine M53.82    10. Decreased functional mobility and endurance Z74.09          Assessment:   Some L hip pain with glute and piriformis stretching; instructed patient to decrease or terminate these if they increase pain.    HEP/POC compliance is  good .  Patient demonstrates understanding/independence with home program.  Patient is benefitting from skilled physical therapy and is making steady progress toward functional goals.  Patient is appropriate to continue with skilled physical therapy intervention, as indicated by initial plan of care.    Goal Status:  Pt. will demonstrate/verbalize independence in self-management of condition in : 6 weeks  Pt. will be independent with home exercise program in : 6 weeks  Pt. will have improved quality of sleep: with less pain;waking less times/night;in 4 weeks  Patient will stand : 30 minutes;with less pain;with less difficultty;for home chores;in 6 weeks  Patient will decrease : JAVIER score;by _ points;for improved quality of function;in 6 weeks;for improved quality of life  by ___ points: 6    Plan / Patient Education:     Continue with initial plan of care.  Progress with home program as tolerated.     Exercises:  Exercise #1: glute and piriformis stretch (with opp leg bent)  Comment #1: 30\" holds, B with use of belt  Exercise #2: supine nerve glides with use of " "belt  Comment #2: x20 B  Exercise #3: abd set  Comment #3: x10 with 5\" holds  Exercise #4: clamshell  Comment #4: 5\" holds, B   Exercise #5: SLR  Comment #5: until fatigue, 5\" holds B  Exercise #6: sit to stands w/u use of UE's   Comment #6: until fatigue  Exercise #7: supine hip flexor stretch  Comment #7: 30\" holds  Exercise #8: rhomberg and modified 1/2 tandem  Comment #8: 1' each, B     Plan for next visit: static and dynamic balance, nolberto, progress abd sets as able with leg ext    Subjective:     Pain Ratin/10 in L hip, 5/10 in low back.    L hip is really bothering him; pain is deep into the groin. Stopped glute and piriformis stretches on L due to pain (as instructed). Back is sore, but tolerated. Sit to stands are hard, but not painful. Up to 6 sit to stands.    Objective:     Flexed trunk  Using SEC  B hip flexors tight  Significant difficulty with balance activities today    Treatment Today     TREATMENT MINUTES COMMENTS   Evaluation     Self-care/ Home management     Manual therapy     Neuromuscular Re-education 8 On flat: modified 1/2 tandem, rhomberg 1' each B. On foam: 1' rhomberg. Flat surface exs added to HEP   Therapeutic Activity     Therapeutic Exercises 17 Nu' Step L5 5'. Discussed progress. Added supine hip flexor stretch   Gait training     Modality__________________                Total 25    Blank areas are intentional and mean the treatment did not include these items.       Cathy Brown, PT, DPT  2017    "

## 2021-06-10 NOTE — PROGRESS NOTES
"[facility] Daily Progress     Patient Name: Liban Nava  Date: 5/8/2017  Visit #: 4  PTA visit #:    Referral Diagnosis: Lumbar nerve root compression  Referring provider: Michelle Miranda CNP  Visit Diagnosis:     ICD-10-CM    1. Status post lumbar spine operation Z98.890    2. Generalized muscle weakness M62.81    3. Unsteadiness on feet R26.81    4. Difficulty walking R26.2    5. Cervical myelopathy G95.9    6. Cervical radiculopathy at C8 M54.12    7. History of fusion of cervical spine Z98.1    8. Decreased ROM of lumbar spine M25.60    9. Decreased ROM of intervertebral discs of cervical spine M53.82    10. Decreased functional mobility and endurance Z74.09          Assessment:   Patient is tolerating treatments well but is likely going to need a significant amount of therapy due to complex PMH/co-morbidities. He is compliant with PT HEP and POC.    HEP/POC compliance is  good .  Patient demonstrates understanding/independence with home program.  Patient is benefitting from skilled physical therapy and is making steady progress toward functional goals.  Patient is appropriate to continue with skilled physical therapy intervention, as indicated by initial plan of care.    Goal Status:  Pt. will demonstrate/verbalize independence in self-management of condition in : 6 weeks  Pt. will be independent with home exercise program in : 6 weeks  Pt. will have improved quality of sleep: with less pain;waking less times/night;in 4 weeks  Patient will stand : 30 minutes;with less pain;with less difficultty;for home chores;in 6 weeks  Patient will decrease : JAVIER score;by _ points;for improved quality of function;in 6 weeks;for improved quality of life  by ___ points: 6    Plan / Patient Education:     Continue with initial plan of care.  Progress with home program as tolerated.     Exercises:  Exercise #1: glute and piriformis stretch (with opp leg bent)  Comment #1: 30\" holds, B with use of " "belt  Exercise #2: supine nerve glides with use of belt  Comment #2: x20 B  Exercise #3: abd set  Comment #3: x10 with 5\" holds  Exercise #4: clamshell  Comment #4: 5\" holds, B   Exercise #5: SLR  Comment #5: until fatigue, 5\" holds B  Exercise #6: sit to stands w/u use of UE's   Comment #6: until fatigue  Exercise #7: supine hip flexor stretch  Comment #7: 30\" holds  Exercise #8: rhomberg and modified 1/2 tandem  Comment #8: 1' each, B     Plan for next visit: test sit to stands, static and dynamic balance, progress abd sets as able with leg ext    Subjective:     Pain Ratin/10 in L hip, 6/10 in low back.    Pain is always there, \"chronic pain\". The stretches help sometimes. Feels the same since surgery. Does more walking and house stuff now than he did before he had the surgery. Lying down seems to help. Doing HEP 2-3x/day    Objective:     Using SEC  R lateral trunk lean with heavy use of SEC; recommend patient use 2 SEC or walker for walking to prevent trunk lean and pain   Tap ups on L harder with decreased stability on R  TU, 12, 13 seconds with use of SEC  Improvement in steadiness with balance exercises and sit to stand transfers    Treatment Today     TREATMENT MINUTES COMMENTS   Evaluation     Self-care/ Home management     Manual therapy     Neuromuscular Re-education 16 On flat and foam: modified 1/2 tandem, rhomberg 1' each B. On 6\" step: 20 tap ups B without HH and 15 step ups B without HH. Side lunges in ll bars x10 to each side. CGA-SBA with balance activities. TUG performed   Therapeutic Activity     Therapeutic Exercises 8 Nu' Step L5 5'. Discussed progress.    Gait training 5 In hallway with SEC   Modality__________________                Total 29    Blank areas are intentional and mean the treatment did not include these items.       Cathy Brown, PT, DPT  2017    "

## 2021-06-10 NOTE — PROGRESS NOTES
"[facility] Daily Progress     Patient Name: Liban Nava  Date: 5/1/2017  Visit #: 2  PTA visit #:    Referral Diagnosis: Lumbar nerve root compression  Referring provider: Michelle Miranda CNP  Visit Diagnosis:     ICD-10-CM    1. Status post lumbar spine operation Z98.890    2. Generalized muscle weakness M62.81    3. Unsteadiness on feet R26.81    4. Difficulty walking R26.2    5. Cervical myelopathy G95.9    6. Cervical radiculopathy at C8 M54.12    7. History of fusion of cervical spine Z98.1    8. Decreased ROM of lumbar spine M25.60    9. Decreased ROM of intervertebral discs of cervical spine M53.82    10. Decreased functional mobility and endurance Z74.09    11. Right lumbar radiculopathy M54.16    12. Right foot drop M21.371          Assessment:   Some L hip pain with glute and piriformis stretching; instructed patient to decrease or terminate these if they increase pain.    HEP/POC compliance is  good .  Patient demonstrates understanding/independence with home program.  Patient is benefitting from skilled physical therapy and is making steady progress toward functional goals.  Patient is appropriate to continue with skilled physical therapy intervention, as indicated by initial plan of care.    Goal Status:  Pt. will demonstrate/verbalize independence in self-management of condition in : 6 weeks  Pt. will be independent with home exercise program in : 6 weeks  Pt. will have improved quality of sleep: with less pain;waking less times/night;in 4 weeks  Patient will stand : 30 minutes;with less pain;with less difficultty;for home chores;in 6 weeks  Patient will decrease : JAVIER score;by _ points;for improved quality of function;in 6 weeks;for improved quality of life  by ___ points: 6    Plan / Patient Education:     Continue with initial plan of care.  Progress with home program as tolerated.     Exercises:  Exercise #1: glute and piriformis stretch (with opp leg bent)  Comment #1: 30\" " "holds, B with use of belt  Exercise #2: supine nerve glides with use of belt  Comment #2: x20 B  Exercise #3: abd set  Comment #3: x10 with 5\" holds  Exercise #4: clamshell  Comment #4: 5\" holds, B   Exercise #5: SLR  Comment #5: until fatigue, 5\" holds B     Plan for next visit: nolberto, progress abd sets as able with leg ext, add sit to stands to HEP, SLR B, Tinnetti    Subjective:     Pain Ratin/10     Some irritation in L hip with glute and piriformis stretches. Did have an injection in that hip end of January.     Objective:     Improved posture since before surgery  Tinnetti: , indicating a HIGH falls risk    Treatment Today     TREATMENT MINUTES COMMENTS   Evaluation     Self-care/ Home management     Manual therapy     Neuromuscular Re-education     Therapeutic Activity     Therapeutic Exercises 25 Nu' Step L5 5'. Discussed progress. Reviewed abd set. Added in clamshell and SLR   Gait training     Modality__________________                Total 25    Blank areas are intentional and mean the treatment did not include these items.       Cathy Brown, PT, DPT  2017    "

## 2021-06-10 NOTE — PROGRESS NOTES
[facility] Daily Progress     Patient Name: Liban Nava  Date: 5/18/2017  Visit #: 7  PTA visit #:    Referral Diagnosis: Lumbar nerve root compression  Referring provider: Michlele Miranda CNP  Visit Diagnosis:     ICD-10-CM    1. Status post lumbar spine operation Z98.890    2. Generalized muscle weakness M62.81    3. Unsteadiness on feet R26.81    4. Difficulty walking R26.2    5. Cervical radiculopathy at C8 M54.12    6. History of fusion of cervical spine Z98.1    7. Decreased ROM of lumbar spine M25.60    8. Decreased ROM of intervertebral discs of cervical spine M53.82    9. Decreased functional mobility and endurance Z74.09          Assessment:   Patient is significantly limited in PT due to left hip pain. He stated that his left hip dislocated after his L THR last fall. It is recommended that he f/u with his hip surgeon ASAP so he is not as limited with his rehab. Patient continues to have significant weakness in B LE.     HEP/POC compliance is  good .  Patient demonstrates understanding/independence with home program.  Patient is benefitting from skilled physical therapy and is making steady progress toward functional goals.  Patient is appropriate to continue with skilled physical therapy intervention, as indicated by initial plan of care.    Goal Status:  Pt. will demonstrate/verbalize independence in self-management of condition in : 6 weeks  Pt. will be independent with home exercise program in : 6 weeks  Pt. will have improved quality of sleep: with less pain;waking less times/night;in 4 weeks  Patient will stand : 30 minutes;with less pain;with less difficultty;for home chores;in 6 weeks  Patient will decrease : JAVIER score;by _ points;for improved quality of function;in 6 weeks;for improved quality of life  by ___ points: 6    Plan / Patient Education:     Continue with initial plan of care.  Progress with home program as tolerated.     Exercises:  Exercise #1: glute and piriformis  "stretch (with opp leg bent)  Comment #1: 30\" holds, B with use of belt  Exercise #2: supine nerve glides with use of belt  Comment #2: x20 B  Exercise #3: abd set with single leg extension  Comment #3: x10 with 5\" holds  Exercise #4: clamshell  Comment #4: 5\" holds, B   Exercise #5: SLR  Comment #5: until fatigue, 5\" holds B  Exercise #6: sit to stands w/u use of UE's   Comment #6: until fatigue  Exercise #7: supine hip flexor stretch  Comment #7: 30\" holds  Exercise #8: rhomberg and modified 1/2 tandem  Comment #8: 1' each, B  Exercise #9: bridge  Comment #9: x10 (small life) and no holds     Plan for next visit:re-vamp HEP- change exs to supine or online R LE    Subjective:     Pain Ratin/10 in L groin/hip, 5-6/10 in low back.    Left hip is really bad. He states that he dislocated it after surgery this past fall; MD aware of this. He did have an injection about 4 months ago, which seemed to help. Can't do exercises on left side without a lot of pain.     Objective:     Using 2 SEC today  B LE weakness noted with exs today  Significantly limited due to hip pain    Treatment Today     TREATMENT MINUTES COMMENTS   Evaluation     Self-care/ Home management     Manual therapy     Neuromuscular Re-education 10 On TE ball: static balancing, alt arm lifts, arm diagonals, marching. Significant difficulty with L LE marching. R SLS 1' at ll- HH needed through most of exs.   Therapeutic Activity     Therapeutic Exercises 15 Nu' Step L5 5'. Discussed progress. Recommend patient f/u with Dr. Corrigan (TKA surgeon). Single R heel and toe lifts in standing.    Gait training     Modality__________________                Total 25    Blank areas are intentional and mean the treatment did not include these items.       Cathy Brown, PT, DPT  2017    "

## 2021-06-10 NOTE — PROGRESS NOTES
"[facility] Daily Progress     Patient Name: Liban Nava  Date: 5/15/2017  Visit #: 5  PTA visit #:    Referral Diagnosis: Lumbar nerve root compression  Referring provider: Michelle Miranda CNP  Visit Diagnosis:     ICD-10-CM    1. Status post lumbar spine operation Z98.890    2. Generalized muscle weakness M62.81    3. Unsteadiness on feet R26.81    4. Difficulty walking R26.2    5. Cervical radiculopathy at C8 M54.12    6. History of fusion of cervical spine Z98.1    7. Decreased ROM of lumbar spine M25.60          Assessment:     HEP/POC compliance is  good .  Patient demonstrates understanding/independence with home program.  Patient is benefitting from skilled physical therapy and is making steady progress toward functional goals.  Patient is appropriate to continue with skilled physical therapy intervention, as indicated by initial plan of care.    Goal Status:  Pt. will demonstrate/verbalize independence in self-management of condition in : 6 weeks  Pt. will be independent with home exercise program in : 6 weeks  Pt. will have improved quality of sleep: with less pain;waking less times/night;in 4 weeks  Patient will stand : 30 minutes;with less pain;with less difficultty;for home chores;in 6 weeks  Patient will decrease : JAVIER score;by _ points;for improved quality of function;in 6 weeks;for improved quality of life  by ___ points: 6    Plan / Patient Education:     Continue with initial plan of care.  Progress with home program as tolerated.     Exercises:  Exercise #1: glute and piriformis stretch (with opp leg bent)  Comment #1: 30\" holds, B with use of belt  Exercise #2: supine nerve glides with use of belt  Comment #2: x20 B  Exercise #3: abd set with single leg extension  Comment #3: x10 with 5\" holds  Exercise #4: clamshell  Comment #4: 5\" holds, B   Exercise #5: SLR  Comment #5: until fatigue, 5\" holds B  Exercise #6: sit to stands w/u use of UE's   Comment #6: until fatigue  Exercise " "#7: supine hip flexor stretch  Comment #7: 30\" holds  Exercise #8: rhomberg and modified 1/2 tandem  Comment #8: 1' each, B  Exercise #9: bridge  Comment #9: x10 (small life) and no holds     Plan for next visit:static and dynamic balance    Subjective:     Pain Rating: \"sore\" in L hip, 5/10 in low back.    Pain is constant in low back; not improving much. Balance and walking have been getting better.     Objective:     Using 2 SEC today  Difficulty with bridge  Sit to stand: 8.5x/30 seconds and 9x/30 seconds without use of hands from mat    Treatment Today     TREATMENT MINUTES COMMENTS   Evaluation     Self-care/ Home management     Manual therapy     Neuromuscular Re-education 10 On foam: rhomberg 1'. Side lunges x10 onto foam with assist of ll bar as needed. Walking 100' without use of AD and CGA of therapist.    Therapeutic Activity     Therapeutic Exercises 19 Nu' Step L5 5'. Discussed progress. Progressed abd sets and added bridge. Sit to stands performed 2x   Gait training     Modality__________________                Total 29    Blank areas are intentional and mean the treatment did not include these items.       Cathy Brown, PT, DPT  5/15/2017    "

## 2021-06-10 NOTE — PROGRESS NOTES
[facility] Daily Progress     Patient Name: Liban Nava  Date: 5/25/2017  Visit #: 8  PTA visit #:    Referral Diagnosis: Lumbar nerve root compression  Referring provider: Michelle Miranda CNP  Visit Diagnosis:     ICD-10-CM    1. Status post lumbar spine operation Z98.890    2. Generalized muscle weakness M62.81    3. Unsteadiness on feet R26.81    4. Difficulty walking R26.2    5. Cervical radiculopathy at C8 M54.12    6. History of fusion of cervical spine Z98.1    7. Decreased ROM of lumbar spine M25.60    8. Decreased ROM of intervertebral discs of cervical spine M53.82    9. Decreased functional mobility and endurance Z74.09          Assessment:   Patient talked to Dr. Corrigan's PA, Irving, who stated that his MRI results of his left hip came back and showed damage from his hip dislocation. Patient was told to follow up in 1 year with hip surgeon. Unfortunately, patient continues to be significantly limited in PT due to left hip pain and is making limited progress with his lower back rehab because of this. I called Dr. Corrigan's office today and left a message asking what the plan is for patient's left hip so he can fully participate in PT in order to rehab his low back. Awaiting call from Dr. Goldstein's office.    HEP/POC compliance is  good .  Patient demonstrates understanding/independence with home program.  Patient is benefitting from skilled physical therapy and is making steady progress toward functional goals.  Patient is appropriate to continue with skilled physical therapy intervention, as indicated by initial plan of care.    Goal Status:  Pt. will demonstrate/verbalize independence in self-management of condition in : 6 weeks  Pt. will be independent with home exercise program in : 6 weeks  Pt. will have improved quality of sleep: with less pain;waking less times/night;in 4 weeks  Patient will stand : 30 minutes;with less pain;with less difficultty;for home chores;in 6 weeks  Patient  "will decrease : JAVIER score;by _ points;for improved quality of function;in 6 weeks;for improved quality of life  by ___ points: 6    Plan / Patient Education:     Continue with initial plan of care.  Progress with home program as tolerated.     Exercises:  Exercise #1: glute and piriformis stretch (R leg only)  Comment #1: 30\" holds, B with use of belt  Exercise #2: supine nerve glides with use of belt (R leg only)  Comment #2: x20 B  Exercise #3: abd set   Comment #3: x10 with 5\" holds  Exercise #4: clamshell (R leg only)  Comment #4: 5\" holds, B   Exercise #5: SLR (R leg only)  Comment #5: until fatigue, 5\" holds B  Exercise #6: sit to stands w/u use of UE's   Comment #6: until fatigue  Exercise #7: supine hip flexor stretch (HOLD- painful L hip for both sides)  Comment #7: 30\" holds  Exercise #8: rhomberg and modified 1/2 tandem  Comment #8: 1' each, B  Exercise #9: bridge  Comment #9: x10 (small lift) and no holds     Plan for next visit:re-vamp HEP- change exs to supine or online R LE    Subjective:     Pain Ratin-7/10 in L groin/hip, 5/10 in low back    Talked with Dr. Corrigan's PA after his MRI which showed damage to his left hip. Dr. Corrigan's PA said that patient was to follow up with them in 1 year to see if healing occurs. Was pretty sore in the hip this morning- in the hot tub at 5:30 am. R leg is feeling great. Weightbearing through L LE increases pain with transfers and gait.     Objective:     Using 2 SEC today  Significantly limited due to hip pain  +slump on R  +crossover effect with L SLR at 45 degrees  Increase in R sided LBP with L SLR    Treatment Today     TREATMENT MINUTES COMMENTS   Evaluation     Self-care/ Home management     Manual therapy     Neuromuscular Re-education     Therapeutic Activity     Therapeutic Exercises 27 Nu' Step L5 5'. Discussed progress. Single R heel and toe lifts in standing. Revamped HEP- see flow sheet.   Gait training     Modality__________________              "   Total 27    Blank areas are intentional and mean the treatment did not include these items.       Cathy Brown, PT, DPT  5/25/2017

## 2021-06-10 NOTE — PROGRESS NOTES
[facility] Daily Progress     Patient Name: Liban Nava  Date: 5/22/2017  Visit #: 8  PTA visit #:    Referral Diagnosis: Lumbar nerve root compression  Referring provider: Michelle Miranda CNP  Visit Diagnosis:     ICD-10-CM    1. Status post lumbar spine operation Z98.890    2. Generalized muscle weakness M62.81    3. Unsteadiness on feet R26.81    4. Difficulty walking R26.2    5. Cervical radiculopathy at C8 M54.12    6. History of fusion of cervical spine Z98.1    7. Decreased ROM of lumbar spine M25.60    8. Decreased ROM of intervertebral discs of cervical spine M53.82    9. Decreased functional mobility and endurance Z74.09          Assessment:   Patient continues to be significantly limited in PT due to left hip pain. He stated that his left hip dislocated after his L THR last fall. After PT recommendation, patient is following up with his MD and getting an MRI today of his left hip.     HEP/POC compliance is  good .  Patient demonstrates understanding/independence with home program.  Patient is benefitting from skilled physical therapy and is making steady progress toward functional goals.  Patient is appropriate to continue with skilled physical therapy intervention, as indicated by initial plan of care.    Goal Status:  Pt. will demonstrate/verbalize independence in self-management of condition in : 6 weeks  Pt. will be independent with home exercise program in : 6 weeks  Pt. will have improved quality of sleep: with less pain;waking less times/night;in 4 weeks  Patient will stand : 30 minutes;with less pain;with less difficultty;for home chores;in 6 weeks  Patient will decrease : JAVIER score;by _ points;for improved quality of function;in 6 weeks;for improved quality of life  by ___ points: 6    Plan / Patient Education:     Continue with initial plan of care.  Progress with home program as tolerated.     Exercises:  Exercise #1: glute and piriformis stretch (with opp leg bent)  Comment  "#1: 30\" holds, B with use of belt  Exercise #2: supine nerve glides with use of belt  Comment #2: x20 B  Exercise #3: abd set with single leg extension  Comment #3: x10 with 5\" holds  Exercise #4: clamshell  Comment #4: 5\" holds, B   Exercise #5: SLR  Comment #5: until fatigue, 5\" holds B  Exercise #6: sit to stands w/u use of UE's   Comment #6: until fatigue  Exercise #7: supine hip flexor stretch  Comment #7: 30\" holds  Exercise #8: rhomberg and modified 1/2 tandem  Comment #8: 1' each, B  Exercise #9: bridge  Comment #9: x10 (small life) and no holds     Plan for next visit:re-vamp HEP- change exs to supine or online R LE    Subjective:     Pain Ratin/10 in L groin/hip, 5/10 in low back.    Having MRI today of L hip. Might have over-did it this week.  Compliant with HEP    Objective:     Using 2 SEC today  improved heel raises  Significantly limited due to hip pain    Treatment Today     TREATMENT MINUTES COMMENTS   Evaluation     Self-care/ Home management     Manual therapy     Neuromuscular Re-education 1 R SLS 1' at ll- HH needed through most of exs.   Therapeutic Activity     Therapeutic Exercises 26 Nu' Step L5 5'. Discussed progress. Single R heel and toe lifts in standing. In supine: SLR until fatigue with 5\" holds. In L side lying, R hip abduction until fatigue 5\" holds. In prone: until fatigue, 5\" holds. 2 sets of all exercises.   Gait training     Modality__________________                Total 27    Blank areas are intentional and mean the treatment did not include these items.       Cathy Brown, PT, DPT  2017    "

## 2021-06-11 NOTE — PROGRESS NOTES
[facility] Daily Progress     Patient Name: Liban Nava  Date: 6/8/2017  Visit #: 11  PTA visit #:    Referral Diagnosis: Lumbar nerve root compression  Referring provider: Michelle Miranda CNP  Visit Diagnosis:     ICD-10-CM    1. Status post lumbar spine operation Z98.890    2. Generalized muscle weakness M62.81    3. Difficulty walking R26.2    4. Unsteadiness on feet R26.81    5. Cervical radiculopathy at C8 M54.12    6. History of fusion of cervical spine Z98.1    7. Decreased ROM of lumbar spine M25.60    8. Decreased functional mobility and endurance Z74.09    9. Decreased ROM of intervertebral discs of cervical spine M53.82    10. Right lumbar radiculopathy M54.16    11. Cervical myelopathy G95.9          Assessment:   Patient has made slow progress in PT due to complex co-morbidities and surgical history. Mostly complicated by L TRINA that dislocated in the fall of 2016 and remains significantly limiting for patient's mobility. He continues to have significant core weakness and low back pain. Patient is compliant with his HEP and PT POC. Will look into decreasing his therapy to 1x/week or every other week.    HEP/POC compliance is  good .  Patient demonstrates understanding/independence with home program.  Patient is benefitting from skilled physical therapy and is making steady progress toward functional goals.  Patient is appropriate to continue with skilled physical therapy intervention, as indicated by initial plan of care.    Goal Status:  Pt. will demonstrate/verbalize independence in self-management of condition in : 6 weeks  Pt. will be independent with home exercise program in : 6 weeks  Pt. will have improved quality of sleep: with less pain;waking less times/night;in 4 weeks  Patient will stand : 30 minutes;with less pain;with less difficultty;for home chores;in 6 weeks  Patient will decrease : JAVIER score;by _ points;for improved quality of function;in 6 weeks;for improved quality  "of life  by ___ points: 6    Plan / Patient Education:     Continue with initial plan of care.  Progress with home program as tolerated.     Exercises:  Exercise #1: glute and piriformis stretch (R leg only)  Comment #1: 30\" holds, B with use of belt  Exercise #2: supine nerve glides with use of belt (R leg only)  Comment #2: x20 B  Exercise #3: abd set+ pilates arch  Comment #3: x10 with 5\" holds  Exercise #4: clamshell with L1 (R leg only)  Comment #4: 5\" holds, B   Exercise #5: SLR (R leg only)  Comment #5: until fatigue, 5\" holds B  Exercise #6: sit to stands w/u use of UE's   Comment #6: until fatigue  Exercise #7: supine L hip isomerics- flex and ext  Comment #7: 5\" holds x15  Exercise #8: rhomberg and modified 1/2 tandem  Comment #8: 1' each, B  Exercise #9: bridge  Comment #9: x10 (small lift) and no holds  Exercise #10: L hip abd into wall and add with pillow isometrics  Comment #10: 5\" holds x15 each     Plan for next visit:  GOALS, TE ball exs, add \"v\" to abd sets, no new exs for HEP, set up more appts-check order from Michelle Miranda, check slump    Subjective:     Pain Ratin/10 in L groin/hip, 6/10 in low back    [facility]- felt pretty sore. Did exercises this morning which helped loosen him up.  Hip isometrics aren't making him worse. Has made 10% progress since beginning PT.     Objective:     Using 2 SEC today  +slump on R  Antalgic, trendelenburg gait on L  Increase in pain in L low back with marching on TE ball; changed to LAQ and decreased his pain  Some increase in LBP with trunk rotations on TE ball; smaller rotations were pain-free  Core weakness noted with ball exs today  Modified Oswestry Low Back Pain Disablity Questionnaire  in %: 56    Treatment Today     TREATMENT MINUTES COMMENTS   Evaluation     Self-care/ Home management     Manual therapy     Neuromuscular Re-education 11 On TE ball: marching on R/LAQ on L, mini rotations with 2# ball, static " seated balance 1'   Therapeutic Activity     Therapeutic Exercises 12 Nu' Step L5 6'. Discussed progress. Added pilates arch to abd set. Had patient fill out JAVIER.   Gait training     Modality__________________                Total 23    Blank areas are intentional and mean the treatment did not include these items.       Cathy Brown, PT, DPT  6/8/2017

## 2021-06-11 NOTE — PROGRESS NOTES
"[facility] Daily Progress     Patient Name: Liban Nava  Date: 6/5/2017  Visit #: 10  PTA visit #:    Referral Diagnosis: Lumbar nerve root compression  Referring provider: Michelle Miranda CNP  Visit Diagnosis:     ICD-10-CM    1. Status post lumbar spine operation Z98.890    2. Generalized muscle weakness M62.81    3. Unsteadiness on feet R26.81    4. Difficulty walking R26.2    5. Cervical radiculopathy at C8 M54.12    6. History of fusion of cervical spine Z98.1    7. Decreased ROM of lumbar spine M25.60    8. Decreased functional mobility and endurance Z74.09    9. Decreased ROM of intervertebral discs of cervical spine M53.82    10. Right lumbar radiculopathy M54.16          Assessment:       HEP/POC compliance is  good .  Patient demonstrates understanding/independence with home program.  Patient is benefitting from skilled physical therapy and is making steady progress toward functional goals.  Patient is appropriate to continue with skilled physical therapy intervention, as indicated by initial plan of care.    Goal Status:  Pt. will demonstrate/verbalize independence in self-management of condition in : 6 weeks  Pt. will be independent with home exercise program in : 6 weeks  Pt. will have improved quality of sleep: with less pain;waking less times/night;in 4 weeks  Patient will stand : 30 minutes;with less pain;with less difficultty;for home chores;in 6 weeks  Patient will decrease : JAVIER score;by _ points;for improved quality of function;in 6 weeks;for improved quality of life  by ___ points: 6    Plan / Patient Education:     Continue with initial plan of care.  Progress with home program as tolerated.     Exercises:  Exercise #1: glute and piriformis stretch (R leg only)  Comment #1: 30\" holds, B with use of belt  Exercise #2: supine nerve glides with use of belt (R leg only)  Comment #2: x20 B  Exercise #3: abd set   Comment #3: x10 with 5\" holds  Exercise #4: clamshell with L1 (R leg " "only)  Comment #4: 5\" holds, B   Exercise #5: SLR (R leg only)  Comment #5: until fatigue, 5\" holds B  Exercise #6: sit to stands w/u use of UE's   Comment #6: until fatigue  Exercise #7: supine L hip isomerics- flex and ext  Comment #7: 5\" holds x15  Exercise #8: rhomberg and modified 1/2 tandem  Comment #8: 1' each, B  Exercise #9: bridge  Comment #9: x10 (small lift) and no holds  Exercise #10: L hip abd into wall and add with pillow isometrics  Comment #10: 5\" holds x15 each     Plan for next visit: abd sets + pilates arch, TE ball exs, no new exs for HEP, set up more appts    Subjective:     Pain Ratin/10 in L groin/hip, 5/10 in low back    Hip is feeling better. New exs are going well- only minor irritation in L hip. Lifting increases L hip pain. Sleeping 60% of the time in bed, 40% in recliner. Walking 4-6 blocks each day- limited by pain     Objective:     Using 2 SEC today  +slump on R  Antalgic, trendelenburg gait on L      Treatment Today     TREATMENT MINUTES COMMENTS   Evaluation     Self-care/ Home management     Manual therapy     Neuromuscular Re-education     Therapeutic Activity     Therapeutic Exercises 19 Nu' Step L5 6'. Discussed progress. Added band to clamshell- 2 sets until fatigue. Reviewed SLS and 1/2 tandem- progressed front foot in 1/2 tandem forward 1/2 inch.   Gait training 6 With 2 SEC- cues to pick of left foot and focus on B heel strike, equal step lengths, weight shifting   Modality__________________                Total 25    Blank areas are intentional and mean the treatment did not include these items.       Cathy Brown, PT, DPT  2017    "

## 2021-06-11 NOTE — PROGRESS NOTES
[facility] Daily Progress/discharge summary     Patient Name: Liban Nava  Date: 8/15/2017  Visit #: 12  PTA visit #:    Referral Diagnosis: Lumbar nerve root compression  Referring provider: Michelle Miranda  Visit Diagnosis:     ICD-10-CM    1. Status post lumbar spine operation Z98.890    2. Generalized muscle weakness M62.81    3. Difficulty walking R26.2    4. Unsteadiness on feet R26.81    5. Cervical radiculopathy at C8 M54.12    6. History of fusion of cervical spine Z98.1    7. Decreased ROM of lumbar spine M25.60    8. Decreased functional mobility and endurance Z74.09    9. Decreased ROM of intervertebral discs of cervical spine M53.82          Assessment:   Patient has made slow progress in PT due to complex co-morbidities and surgical history. Mostly complicated by L TRINA that dislocated in the fall of 2016 and remains significantly limiting for patient's mobility. He continues to have significant core weakness and low back pain. Patient is compliant with his HEP and PT POC. Patient's POC has  and patient's referring provider, Michelle Miranda, has not approved additional visits of PT. Patient was encouraged to continue with [facility] and [facility] and return as needed in the future. His chart will remain open for 30 days and after that time period, he will be discharged from PT.    HEP/POC compliance is  good .  Patient demonstrates understanding/independence with home program.  Patient is benefitting from skilled physical therapy and is making steady progress toward functional goals.  Patient is appropriate to continue with skilled physical therapy intervention, as indicated by initial plan of care.    Goal Status:  Pt. will demonstrate/verbalize independence in self-management of condition in : 6 weeks;Not Met  Pt. will be independent with home exercise program in : 6 weeks;Met  Pt. will have improved quality of sleep: with less pain;waking less times/night;in 4  "weeks;Not Met (\"the same\")  Patient will stand : 30 minutes;with less pain;with less difficultty;for home chores;in 6 weeks;Not Met (still pretty difficult- can stand for 10 minutes only)   Patient will decrease : JAVIER score;by _ points;for improved quality of function;in 6 weeks;for improved quality of life;Met  by ___ points: 6    Plan / Patient Education:     Continue with initial plan of care.  Progress with home program as tolerated.     Exercises:  Exercise #1: glute and piriformis stretch (R leg only)  Comment #1: 30\" holds, B with use of belt  Exercise #2: supine nerve glides with use of belt (R leg only)  Comment #2: x20 B  Exercise #3: abd set+ pilates arch  Comment #3: x10 with 5\" holds  Exercise #4: clamshell with L1 (R leg only)  Comment #4: 5\" holds, B   Exercise #5: SLR (R leg only)  Comment #5: until fatigue, 5\" holds B  Exercise #6: sit to stands w/u use of UE's   Comment #6: until fatigue  Exercise #7: supine L hip isomerics- flex and ext  Comment #7: 5\" holds x15  Exercise #8: rhomberg and modified 1/2 tandem  Comment #8: 1' each, B  Exercise #9: bridge  Comment #9: x10 (small lift) and no holds  Exercise #10: L hip abd into wall and add with pillow isometrics  Comment #10: 5\" holds x15 each     Plan for next visit:  Hold chart for 30 days    Subjective:     Pain Rating: 3/10 in L groin/hip, 6/10 in low back \"deep pain\"    Still difficult putting on pants still bothers his left hip. Exercises are feeling good. Went to the [facility] today and that went well. Nerve glides bother his left side as well. Still doesn't feel that he's made a lot of progress, despite being compliant with HEP and POC. Balance still feels off.     Objective:     Using 2 SEC today  Neg slump on R  Antalgic, trendelenburg gait on L  Modified Oswestry Low Back Pain Disablity Questionnaire  in %: 56    Treatment Today     TREATMENT MINUTES COMMENTS   Evaluation     Self-care/ Home management     Manual therapy   "   Neuromuscular Re-education     Therapeutic Activity     Therapeutic Exercises 24 Nu' Step L5 6'. Discussed progress, goals, and possible D/C from PT. Added pilates arch diagonals to abd set. Gave resources for chronic pain lectures, as well as pain psych therapists in case he is interested in these. Answered patient questions.    Gait training     Modality__________________                Total 24    Blank areas are intentional and mean the treatment did not include these items.       Cathy Brown, PT, DPT  8/15/2017

## 2021-06-11 NOTE — PROGRESS NOTES
[facility] Daily Progress     Patient Name: Liban Nava  Date: 6/2/2017  Visit #: 9  PTA visit #:    Referral Diagnosis: Lumbar nerve root compression  Referring provider: Michelle Miranda CNP  Visit Diagnosis:     ICD-10-CM    1. Status post lumbar spine operation Z98.890    2. Generalized muscle weakness M62.81    3. Unsteadiness on feet R26.81    4. Difficulty walking R26.2    5. Cervical radiculopathy at C8 M54.12    6. History of fusion of cervical spine Z98.1    7. Decreased ROM of lumbar spine M25.60    8. Decreased functional mobility and endurance Z74.09          Assessment:   PA stated that patient is to continue with PT at this time. He has decreased the amount of exercises that he performs on his left hip and it's feeling better this week. His back is slightly flared up from over-doing activities at his mother-in-law's house yesterday. Patient was able to tolerate new left hip strengthening exercises today without increased pain; will progress as he tolerated. Will contact Michelle Miranda, referring provider, for additional PT order.      HEP/POC compliance is  good .  Patient demonstrates understanding/independence with home program.  Patient is benefitting from skilled physical therapy and is making steady progress toward functional goals.  Patient is appropriate to continue with skilled physical therapy intervention, as indicated by initial plan of care.    Goal Status:  Pt. will demonstrate/verbalize independence in self-management of condition in : 6 weeks  Pt. will be independent with home exercise program in : 6 weeks  Pt. will have improved quality of sleep: with less pain;waking less times/night;in 4 weeks  Patient will stand : 30 minutes;with less pain;with less difficultty;for home chores;in 6 weeks  Patient will decrease : JAVIER score;by _ points;for improved quality of function;in 6 weeks;for improved quality of life  by ___ points: 6    Plan / Patient Education:     Continue  "with initial plan of care.  Progress with home program as tolerated.     Exercises:  Exercise #1: glute and piriformis stretch (R leg only)  Comment #1: 30\" holds, B with use of belt  Exercise #2: supine nerve glides with use of belt (R leg only)  Comment #2: x20 B  Exercise #3: abd set   Comment #3: x10 with 5\" holds  Exercise #4: clamshell (R leg only)  Comment #4: 5\" holds, B   Exercise #5: SLR (R leg only)  Comment #5: until fatigue, 5\" holds B  Exercise #6: sit to stands w/u use of UE's   Comment #6: until fatigue  Exercise #7: supine L hip isomerics- flex and ext  Comment #7: 5\" holds x15  Exercise #8: rhomberg and modified 1/2 tandem  Comment #8: 1' each, B  Exercise #9: bridge  Comment #9: x10 (small lift) and no holds  Exercise #10: L hip abd into wall and add with pillow isometrics  Comment #10: 5\" holds x15 each     Plan for next visit: abd sets + pilates arch, TE ball exs, no new exs for HEP, set up more appts, try balance exs again    Subjective:     Pain Ratin/10 in L groin/hip, 6/10 in low back    Helped wife go through  mother-in-law's house and was sore with doing that. Has been laying off exercises on left hip this past week- feeling a little better. Thinks that he over-did it at his mother-in-laws. His neck and his R arm sometimes bother him, especially in the mornings. He continues to do his neck exs from his previous PT    Objective:     Using 2 SEC today  Improved gait today  +slump on R  No pain with new exs    Treatment Today     TREATMENT MINUTES COMMENTS   Evaluation     Self-care/ Home management     Manual therapy     Neuromuscular Re-education     Therapeutic Activity     Therapeutic Exercises 27 Nu' Step L5 5'. Discussed progress. Added in L hip isometrics- flexion, ext, add and abd. Answered patient questions   Gait training     Modality__________________                Total 27    Blank areas are intentional and mean the treatment did not include these items.       Cathy " Kevin, PT, DPT  6/2/2017

## 2021-06-16 NOTE — PROGRESS NOTES
Pt is s/p RIGHT L5 ROOT DECOMPRESSION 3-8-17.   His right leg pain went away after surgery, but now has constant low back pain with occasional right leg pain, past few months.  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 Had left hip replacement 8/2016.  Left leg has numbness and tingling.  Walking with a cane, balance is poor.    JAVIER= 60%    Roxie Tolentino RN

## 2021-06-17 NOTE — PROGRESS NOTES
Assessment:   Liban Nava is a 57 y.o. y.o. male with past medical history significant for GERD, hypertension who presents today for follow-up regarding chronic and progressive right neck pain with radiation to the right upper extremity in the distribution of the right C8 nerve root.  Patient has a history of a cervical fusion from C3 through C7 as well as a cervical laminoplasty C3 through C5.  MRI of the cervical spine shows moderately severe bilateral foraminal stenosis at C7-T1 with potential impingement of the C8 nerve roots.  -Patient also complains of increased severity in his chronic headaches, a 6 week history of right facial numbness, as well as intermittent dizziness, balance changes, blurry vision, and nausea.  I would like to rule out intracranial pathology.  Headaches could certainly be related to severe right C2-3 facet arthropathy.  Facial numbness may be referred from trigger points in the right sternocleidomastoid.  Patient did have hypertonicity in the cervical paraspinous musculature.  The patient demonstrates weakness in the right upper extremity throughout.  This is stable compared with the last time I saw the patient, on January 11, 2017.       Plan:     A shared decision making plan was used.  The patient's values and choices were respected.  The following represents what was discussed and decided upon by the physician assistant and the patient.      1.  DIAGNOSTIC TESTS: I reviewed the MRI cervical spine.  I placed an order for an MRI of the head with and without contrast for further evaluation given his concerning constellation of symptoms.    2.  PHYSICAL THERAPY: Physical therapy is ordered.  The patient has been in physical therapy for his neck most recently in January 2017.  I did encourage him to do his home exercises.    3.  MEDICATIONS:   -I provided a prescription for Valium to be taken prior to the MRI for claustrophobia.  -No other changes are made to the patient's  medications.  The patient uses Vicodin 2 tabs at bedtime, cyclobenzaprine 10 mg as needed, tramadol 50 mg 4 times per day, gabapentin 900 mg 3 times per day, and tizanidine at bedtime.    4.  INTERVENTIONS: No interventions were ordered.  If the MRI of the head is negative, I do think the patient could potentially benefit from interventional pain management.  Of note, he had a C7-T1 intralaminar epidural steroid injection in December 2016 which only provided 30% relief of his pain short-term.  Right C7-T1 transforaminal epidural steroid injection had previously been attempted at an outside facility but was unsuccessful due to the severity of the stenosis.  We could consider a second attempt at the right C7 transforaminal epidural steroid injection to address the patient's right C8 radiculitis.  -In regards to the headaches, I would recommend starting with a right C2, C3 medial branch block.  If he had a positive response, he can have a right C2-3 facet joint injection.  -He could also benefit from trigger point injections.    5.  PATIENT EDUCATION: The patient had several questions about possible surgery.  The patient was seen by Dr. Gutierrez who felt that she did not have anything surgical to offer.  If pain is refractory, we could consider referring him to get a second opinion.  -Patient is in agreement with the above plan.  All questions were answered.    6.  FOLLOW-UP: A nurse will call patient with the results of his MRI.  I will then have the patient follow-up with me to review the results and discuss treatment options.  He has any questions or concerns in the meantime, he should not hesitate contact our clinic.    Subjective:     Liban Nava is a 57 y.o. male who presents today for follow-up regarding chronic and progressive right neck pain with radiation into the right upper extremity as well as worsening headaches, a 6 week history of right facial numbness, and intermittent balance changes, dizziness,  blurry vision.  I last saw the patient on January 11, 2017.  At that time he is following up after a C7-T1 intralaminar epidural steroid injection which was performed on December 29, 2016.  This injection provided 30% relief of his pain, according to the follow-up note.  The patient is not a member how long the relief lasted.  He had a right L5 nerve root decompression on March 8, 2017 with Dr. Gutierrez.  The patient recently followed up with Dr. Gutierrez to discuss his neck pain.  She did not feel that there is anything surgical that she could offer and recommended that he return to our clinic.    The patient complains of right neck pain.  The pain radiates into the right shoulder, down the triceps, into the ulnar forearm, and extending into digits 4 and 5 of the right hand.  The patient feels weak in the right arm.  He has numbness in digits 4 and 5 of the right hand.  The patient states that he has chronic headaches but they are getting more severe.  He feels that the pain comes up from the right neck into the head causes the headache.  For the past 6 weeks, he has noticed numbness in the right base near the jaw.  The patient states that the numbness and tingling is aggravated by turning his head to the right.  The patient states that he has been having intermittent blurry vision.  He states that he feels off balance.  He has occasional dizziness.  He states that he has been feeling nauseated at night.  Patient rates his pain today as an 8 out of 10.  At its best it is a 4 out of 10.  At its worst it is a 10 out of 10.  Having difficulty sleeping because of the pain.  His pain is aggravated with moving his neck in flexion, extension, and lateral rotation.  Pain is alleviated temporarily with applying heat and ice.    The patient has had physical therapy for his neck.  His most recent course of physical therapy for his neck ended in January 2017.  The patient uses Vicodin 2 tabs at bedtime.  He takes Flexeril 10 mg as  needed.  He uses tramadol 50 mg 4 times per day.  He takes gabapentin 900 mg 3 times daily.  He also uses tizanidine 4 mg at bedtime.    Past medical history is reviewed and is pertinent for the right L5 nerve root decompression on March 8, 2017 with Dr. Gutierrez.  Is also pertinent with following up with his hip surgeon.  His hip surgeon has recommended replacing his antibiotic spacer in the fall.    Family history is reviewed and is unchanged in the interim.    Review of Systems:  Positive for numbness/tingling, weakness, headache, dizziness, nausea, blurry vision, balance changes.  Negative loss of bowel/bladder control, footdrop.     Objective:   CONSTITUTIONAL:  Vital signs as above.  No acute distress.  The patient is well nourished and well groomed.    PSYCHIATRIC:  The patient is awake, alert, oriented to person, place and time.  The patient is answering questions appropriately with clear speech.  Normal affect.  HEENT: Normocephalic, atraumatic.  Sclera clear.  Neck is supple.  SKIN:  Skin over the face, neck bilateral upper extremities is clean, dry, intact without rashes.  MUSCULOSKELETAL: The patient has 4/5 strength in the right shoulder abductors, elbow flexors/extensors, wrist extensors, grasp, 4-/5 strength with right finger abductors.  Strength is 5/5 in the left upper extremity throughout.  NEUROLOGICAL: The patient reports a sensory deficit in the right face along the angle of the mandible and extending into the lower cheek.  Extraocular movements intact.  Smile symmetric.  Weakness in right shoulder elevators.  Tandem gait slightly unsteady.  Patient ambulates with a cane.  1+ biceps, brachioradialis, triceps reflexes bilaterally.  Negative Cross's bilaterally.  Sensation to light touch is diminished in the right C8 dermatome.    RESULTS: I reviewed an MRI cervical spine from [facility]y dated October 6, 2017.  This shows postoperative changes of an interbody fusion from C3 through C7 which is  solid.  There is an anterior plate and screw device at C4-5.  At C2-3 there is severe right facet arthritis and mild foraminal stenosis.  At C7-T1 there is severe bilateral facet arthropathy with 3 mm of degenerative anterolisthesis, moderately severe disc degeneration, and moderate to severe bilateral foraminal stenosis.  Please see report for further details.

## 2021-06-17 NOTE — PROGRESS NOTES
Liban is here with new c/o neck pain that goes into right shoulder and right arm and into 4th and 5th finger. Numbness and tingling in 4th and 5th fingers and up right arm along with weakness. He has been having symptoms for 7-8 months. He has gait issues due to hip issues.   No bladder or bowel changes. No conservative treatment. Turning head and putting head back makes it worse and laying down and ice help.   He has had a previous Cervical fusion by Dr. Chandler.   NDI today is 54%  Ara,CMA

## 2021-06-17 NOTE — PROGRESS NOTES
Liban is here with new c/o neck pain that goes into right shoulder and right arm and into 4th and 5th finger. Numbness and tingling in 4th and 5th fingers and up right arm along with weakness. He has been having symptoms for 7-8 months. He has gait issues due to hip issues.   No bladder or bowel changes. No conservative treatment. Turning head and putting head back makes it worse and laying down and ice help.   He has had a previous Cervical fusion by Dr. Chandler.   NDI today is 54%    I informed Mr. Nava that his problems are out of my ballpark.  I have never done a 4 level fusion in the cervical spine.  He has not had any conservative management.  I do not detect anything surgical that I can do to help him.    [place] for consultation with the spine center to see if they can help him become a bit more comfortable.    Sabrina Gutierrez MD, FACS, [facility]

## 2021-06-17 NOTE — PROGRESS NOTES
"Pt is s/p RIGHT L5 ROOT DECOMPRESSION 3-8-17.     His right leg pain went away after surgery, but now has constant low back pain with occasional right leg pain, past few months.  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 Had left hip replacement 8/2016.  Left leg has numbness and tingling.  Walking with a cane, balance is poor.    JAVIER= 60%    Past Medical History:   Diagnosis Date     Anemia      Back pain with radiation     to legs     BPH (benign prostatic hyperplasia)      Cervical spondylosis      Degenerative disc disease, lumbar      Degenerative joint disease      GERD (gastroesophageal reflux disease)      History of anesthesia complications     C3-6 fusion     Hypertension     not currently on meds     Lumbar spondylosis      Neck pain      Vitamin D deficiency      Past Surgical History:   Procedure Laterality Date     APPENDECTOMY       C3-6 laminoplasty       CERVICAL FUSION  2000, 2004, 2014     CHOLECYSTECTOMY       HERNIA REPAIR Right      JOINT REPLACEMENT Left     TRINA     LAMINOPLASTY Bilateral 5/19/2014    C3-5 laminoplasty with C6 partial laminectomy on 5/19/2014 by Dr. Sabrina Gutierrez for cervical myeloplathy     LEG SURGERY Bilateral     to correct \"bowed legs\"     LUMBAR LAMINECTOMY Bilateral 6/12/2014    L3 L4 laminectomy for stenosis, bilateral L34 medial facetectomy for lateral recess decompression, and bilateral L34 foraminotomy on 6/12/2014 by Dr. Sabrina Gutierrez.     LUMBAR LAMINECTOMY  3/8/2017    Procedure: RIGHT L5 ROOT DECOMPRESSION;  Surgeon: Sabrina Gutierrez MD;  Location: [facility];  Service:      NOSE SURGERY      polypectomy     SINUS SURGERY       Current Outpatient Prescriptions on File Prior to Visit   Medication Sig Dispense Refill     acetaminophen (TYLENOL) 325 MG tablet Take 325 mg by mouth every 6 (six) hours as needed for pain.        ASCORBATE CALCIUM (VITAMIN C ORAL) Take 500 mg by mouth daily.        calcium carbonate (OS-DANELLE) 600 mg (1,500 mg) tablet Take " 600 mg by mouth daily.       celecoxib (CELEBREX) 100 MG capsule Take 100 mg by mouth 2 (two) times a day as needed for pain.        cyclobenzaprine (FLEXERIL) 10 MG tablet Take 10 mg by mouth 3 (three) times a day as needed for muscle spasms.       gabapentin (NEURONTIN) 300 MG capsule Take 900 mg by mouth 3 (three) times a day.        methocarbamol (ROBAXIN) 500 MG tablet Take 500 mg by mouth 3 (three) times a day as needed.       MULTIVITAMIN (MULTIPLE VITAMIN ORAL) Take 1 tablet by mouth daily.       ranitidine (ZANTAC) 150 MG tablet Take 150 mg by mouth 2 (two) times a day.        TAMSULOSIN HCL (TAMSULOSIN ORAL) Take 0.4 mg by mouth daily. After a meal       traMADol (ULTRAM) 50 mg tablet Take 50 mg by mouth 4 (four) times a day.        No current facility-administered medications on file prior to visit.      Allergies:  Cat dander and Cephalexin    A 12 point review of systems is positive for those symptoms mentioned above with the left leg weakness.  He has a history of arthritis.    Exam:  BP (!) 147/103  Pulse 94  Temp 97.9  F (36.6  C)  Resp 16  SpO2 100%     Mental status: Alert and oriented, mood and affect appropriate, language reception and expression normal, recent and remote memory is normal, higher cortical function normal. Attention span, concentration and ability to follow commands is normal.    Renal nerves II through XII are grossly intact.    Motor does show the weakness of the left leg that he has had since the hip was replaced.  This is mainly in the thigh on the left.  There is some sensory changes as well.  The right leg really does not bother him much.  He has back pain but no significant sensory loss.    Assessment: Increased low back pain with radicular component    Plan: He would benefit from returning to PT.  He also should have a new imaging as his imaging is over a year old.

## 2021-06-18 NOTE — PROGRESS NOTES
Procedures  Pre-procedure diagnosis: Right occipital neuralgia  Post-procedure diagnosis:  Same  PROCEDURE: Right occipital nerve block.    The risks of the procedure were discussed with the patient.  These include infection, bleeding, increased pain, no change in pain were discussed with the patient.  The patient gave written and verbal consent to proceed with the procedure.    The most tender area of the right occipital nerve at the nuchal ridge was palpated.  This area was then cleansed with a betadine swab x 3.  A solution of 1 mL of 40 mg of Depomedrol mixed with 2 mL of 1% lidocaine was drawn up.  A 27 guage 1.25 inch needle was inserted down to os.  After aspiration was negative, 1 mL of the solution was injected.  Without withdrawing the needle from the skin, the needle was redirected.  After aspiration was negative, 1 mL of the solution was injected.  Without withdrawing the needle from the skin, the needle was once again redirected.  After aspiration was negative, 1 mL of the remaining solution was injected.      The patient tolerated the procedure well.  The patient was monitored for a short period of time and then discharged under his own power.

## 2021-06-18 NOTE — PROGRESS NOTES
Assessment:   Liban Nava is a 57 y.o. y.o. male with past medical history significant for GERD, hypertension who presents today for follow-up regarding chronic and progressive right neck pain with radiation to the right upper extremity in the distribution of the right C8 nerve root.  Patient has a history of a cervical fusion from C3 through C7 as well as cervical laminoplasty C3 through C5.  MRI cervical spine shows moderately severe bilateral foraminal stenosis at C7-T1 with potential impingement of the C8 nerve roots.  The patient status post a right C7-T1 transforaminal epidural steroid injection on June 1, 2018 which has provided 80% relief of his right arm symptoms.  Patient continues to have significant right upper axial neck pain along with pain radiating into the headache and face with numbness and tingling in the right cheek and ear.  Patient failed a right C2, C3 medial branch block.  Patient does have no physical therapy was ordered.  The patient severe right C2-3 facet arthropathy.  On exam today, the patient has significant palpation over the right greater occipital nerve, consistent with right occipital neuralgia.  An MRI of the head was unremarkable.       Plan:     A shared decision making plan was used.  The patient's values and choices were respected.  The following represents what was discussed and decided upon by the physician assistant and the patient.      1.  DIAGNOSTIC TESTS: I reviewed the MRI cervical spine and MRI brain.  No further diagnostic tests were ordered.    2.  PHYSICAL THERAPY: Has been in physical therapy most recently for his neck in June 2017.  Encouraged to continue doing his home exercises.    3.  MEDICATIONS: No changes are made to the patient's medications.  He uses 2 tabs of Vicodin at bedtime.  He uses tramadol 50 mg 4 times daily.  He uses gabapentin 900 mg 3 times daily.  He uses tizanidine at bedtime.    4.  INTERVENTIONS: A right occipital nerve block was  performed today.  Please see separate procedure note for details.  If this fails to provide relief, we could consider trigger point injections.  -If right arm pain were to return, we could repeat the right C7-T1 transforaminal epidural steroid injection.    5.  PATIENT EDUCATION: Patient is in agreement with the above plan.  All questions were answered.    6.  FOLLOW-UP: I will see patient back in clinic in 2 weeks if feeling he has responded to the occipital nerve block.  If he has any questions or concerns in the meantime, he should not contact our clinic.    Subjective:     Liban Nava is a 57 y.o. male who presents today for follow-up regarding right neck pain with radiation into the right upper extremity associated numbness and tingling as well as right-sided facial numbness and tingling and headaches.  Patient status post a right C7-T1 transforaminal epidural steroid injection on June 1, 2018.  Patient reports that has provided 80% relief of his arm pain.  Has only mild residual right arm pain.  Right arm numbness and tingling has resolved.  He failed a right C2, C3 medial branch block on May 18, 2018.  Patient continues to have right neck pain and facial numbness.    Patient complains of pain localized to the right neck.  Pain is most severe at the craniocervical junction.  Pain radiates up into the occipital region causing headache.  Patient has numbness and tingling involving the right ear, jaw, and cheek.  This pain is aggravated with turning his neck to the right.  He states that he feels like sandpaper in his neck when he turns.  He rates his pain today as a 7 out of 10.  Any physical activity tends to aggravate the pain.  Applying heat and ice help to alleviate the pain.  He denies any new symptoms since he was last seen.    Patient of physical therapy for his neck most recently in June 2017.  Patient uses Vicodin 2 tabs at bedtime, tramadol 50 mg 4 times daily, gabapentin 900 mg 3 times daily,  tizanidine at bedtime.    Past medical history is reviewed and is unchanged in the interim.    Family history is reviewed and is unchanged in the interim.    Review of Systems:  Positive for numbness/tingling, headache, blurry vision, balance changes.  Negative for loss of bowel/bladder control, footdrop, weakness, dizziness, nausea/vomiting.     Objective:   CONSTITUTIONAL:  Vital signs as above.  No acute distress.  The patient is well nourished and well groomed.    PSYCHIATRIC:  The patient is awake, alert, oriented to person, place and time.  The patient is answering questions appropriately with clear speech.  Normal affect.  HEENT: Normocephalic, atraumatic.  Sclera clear.  Neck is supple.  SKIN:  Skin over the face, neck bilateral upper extremities is clean, dry, intact without rashes.  MUSCULOSKELETAL: The patient has 5/5 strength for the bilateral shoulder abductors, elbow flexors/extensors, wrist extensors, finger flexors/abductors.  Cervical range of motion is restricted with lateral rotation to the right.  NEUROLOGICAL:   Sensation to light touch is intact over bilateral upper extremities throughout.  Patient has tenderness to palpation over the greater occipital nerve (longterm the distance between the inion in the right mastoid process).    RESULTS:    I reviewed an MRI cervical spine from [facility] dated October 6, 2017.  This shows postoperative changes of an interbody fusion from C3 through C7 which is solid.  There is an anterior plate and screw device at C4-5.  At C2-3 there is severe right facet arthritis and mild foraminal stenosis.  At C7-T1 there is severe bilateral facet arthropathy with 3 mm of degenerative anterolisthesis, moderately severe disc degeneration, and moderate to severe bilateral foraminal stenosis.  Please see report for further details.    I also reviewed the MRI of brain with and without contrast from [facility] dated May 16, 2018.  This is normal.  No acute infarct,  mass, or hemorrhage.

## 2021-06-19 NOTE — PROGRESS NOTES
Post injection F/U  --6/15/18 right occipital nerve block injection  --No relief  --Continue to have pain/numbness in the right posterior neck, no change  -Right arm pain/numbness slightly better per pt  --Rates pain 6/10  --PT x 12 sessions [facility] for lumbar pain 6/12/17    Medication  --Tramadol 50 mg 1 tab three times a day   --Vicodin 5-325 mg 1-2 tab at bedtime  --Tizanidine 4 mg 4 tabs at bedtime per pt  --Gabapentin 300 mg (3-3-3)  --Flexeril 10 mg PRN  --Celebrex 100 mg 1 tab two times a day   --Tylenol 500 mg 1 tab Q8H PRN

## 2021-06-19 NOTE — PROGRESS NOTES
"NEUROSURGERY FOLLOWUP  NOTE    Liban Nava comes today in f/u after prior apt on 7/20/18 for cervical foraminal stenosis. He s/p multiple spinal surgeries (1- level ACDF 2000, 2-level ACDF 2002, 3-5 laminoplasty with C6 partial laminectomy in 5/2014, L3-4 decompression 6/2014, R L5 decompression 3/17 who presented with with chronic neck pain, R C2 radiculopathy, R C8 radiculopathy, imbalance, blurry vision. Recent C7-T1 right TFESI improved arm pain. Medial branch block at right C2-3 did not improve pain and paresthesias. Since his last visit he also complains of headaches now in addition to the R C2 radicular pain. He denies any other new changes. He is undergoing a kidney stone removal later this month and his a revision arthoplasty to this left hip with removal of his antibiotic hip spacer at the end of September.       The pts PMH, PSH, ROS, Meds, Allergies, SH, FH are all unchanged and summarized in the pts health history from last visit        PHYSICAL EXAM:   Constitutional: /83  Pulse 96  Ht 5' 8.5\" (1.74 m)  Wt 184 lb (83.5 kg)  SpO2 97%  BMI 27.57 kg/m2     Mental Status: A & O in no acute distress.  Affect is appropriate.  Speech is fluent.  Recent and remote memory are intact.  Attention span and concentration are normal.     Cranial Nerves: CN1: grossly intact per patient recall. CN2: No funduscopic exam performed. CN3,4 & 6: Pupillary light response, lateral and vertical gaze normal.  No nystagmus.  Visual fields are full to confrontation. CN5: Intact to touch CN7: No facial weakness, smile, facial symmetry intact. CN8: Intact to spoken voice. CN9&10: Gag reflex, uvula midline, palate rises with phonation. CN11: Shoulder shrug 5/5 intact bilaterally. CN12: Tongue midline and moves freely from side to side.     Motor: No pronator drift of upper extremity.   Motor  LUE 5/5  RUE del 4/5 bi 4+/5 tri 4/5 WE WF 4/5  4/5 intrinics 5/5  BLE 5/5      Sensory: Sensation intact bilaterally to " light touch except diminished in R C2 distribution and in 3rd -5 th digits on the right hand      Coordination:  ambulates with a cane, unsteady gait       Reflexes; supinator, biceps, triceps, knee/ ankle jerk intact.  No hoffmans/ No babinski/ clonus.       IMAGING:   I personally reviewed all radiographic images        CONSULTATION ASSESSMENT AND PLAN:    56 yo male s/p multiple spinal surgeries (1- level ACDF 2000, 2-level ACDF 2002, 3-5 laminoplasty with C6 partial laminectomy in 5/2014, L3-4 decompression 6/2014, R L5 decompression 3/17 who presents with chronic neck pain, R C2 radiculopathy, R C8 radiculopathy.  Interval CT and baseline XR reviewed, solid fusion from C3-7 straightening of normal lordotic curve of c-spine.    Recommend proceeding with kidney stone removal and left hip arthoplasty as planned. His left hip infection is supposed to be cleared following this surgery.   He has not had PT in a year. Will order PT.  Will refer him C7-T1 right TFESI given he had significant relief with the last injection. Will also refer for consideration for R C2 RFA  Will have him f/u following above.     I spent more than 15 minutes in this apt, examining the pt, reviewing the scans, reviewing notes from chart, discussing treatment options with risks and benefits and coordinating care. >50 % clinic time was spent in face to face counseling and coordinating care    Isamar Gray      CC:     Roman Ramos MD  [address] [facility] [address]

## 2021-06-19 NOTE — PROGRESS NOTES
[facility] Daily Progress     Patient Name: Liban Nava  Date: 8/17/2018  Visit #: 3  PTA visit #:  -  Referral Diagnosis: neural foraminal stenosis  Referring provider: Isamar Gray  Visit Diagnosis:     ICD-10-CM    1. Right cervical radiculopathy M54.12    2. Generalized muscle weakness M62.81    3. Poor posture R29.3        Past Medical History:   Diagnosis Date     Anemia      Back pain with radiation     to legs     BPH (benign prostatic hyperplasia)      Cervical spondylosis      Degenerative disc disease, lumbar      Degenerative joint disease      DVT (deep venous thrombosis) (H)      GERD (gastroesophageal reflux disease)      History of anesthesia complications     C3-6 fusion     Hypertension     not currently on meds     Lumbar spondylosis      Neck pain      Vitamin D deficiency          Assessment:   Patient is doing much better since his HEP was modified last session. He has noticed slight change in radicular symptoms into R UE. He demonstrates slight improvement in R  strength today as well.    HEP/POC compliance is  good .  Patient demonstrates understanding/independence with home program.  Patient is benefitting from skilled physical therapy and is making steady progress toward functional goals.  Patient is appropriate to continue with skilled physical therapy intervention, as indicated by initial plan of care.    Goal Status:  Pt. will demonstrate/verbalize independence in self-management of condition in : 6 weeks  Pt. will be independent with home exercise program in : 6 weeks  Pt. will have improved quality of sleep: with less pain;waking less times/night;in 6 weeks  Patient will decrease : NDI score;by _ points;in 6 weeks  Pt will: have decreased numbness and tingling into R UE by at least 50% within 8 weeks in order to improve his QOL and function.  Pt will: have improved strength by 10# in order to improve  strength for meal prep within 8 weeks.    Plan / Patient  "Education:     Continue with initial plan of care.  Progress with home program as tolerated.    Exercises:  Exercise #1: radial nerve tensioners w/o head involvement  Comment #1: x20 on R  Exercise #2: ulnar nerve glides- butterfly (HOLD)  Comment #2: x20 B  Exercise #3: nerve tensioners at 45 degrees abd w/o head involvement and hands together   Comment #3: x20 B  Exercise #4: supine pec stretch  Comment #4: 30\" holds  Exercise #5: scap sets  Comment #5: x10 with 5\" holds    Plan for next visit: Rock tool R UT, Check  in R hand, try R UT and scalene stretch w/o overpressure. Did he find a good pillow?    Subjective:     Pain Rating:     Headaches make it difficult to sleep. Feeling better since getting rid of butterfly exercise. Vision is blurry on L. Has vision last checked 6 months ago. Has noticed change in radicular symptoms into R UE. Icing. Having difficulty finding the right pillow.  seems to be a little better.      Objective:     Tender R UT, scalene, rhomboid, middle trap  Posture is slightly improved  No increase in pain after MT today  R : 45, 35, 32#        Treatment Today     TREATMENT MINUTES COMMENTS   Evaluation     Self-care/ Home management     Manual therapy 16 Rock tool to R UT, scalene, rhomboid, middle trap   Neuromuscular Re-education     Therapeutic Activity     Therapeutic Exercises 10 Discussed progress. Objective measures taken. Progressed and modified HEP- see flow sheet for details. Discussed continuing to work on good posture and trying out new pillows   Gait training     Modality__________________                Total 26    Blank areas are intentional and mean the treatment did not include these items.       Cathy Brown, PT, DPT  8/17/2018    "

## 2021-06-19 NOTE — PROGRESS NOTES
NEUROSURGERY CONSULTATION NOTE    7/20/2018     Liban Nava is a 57 y.o. male who is sent to us in consultation by Gia Conn  for evaluation of cervical foraminal stenosis.      CONSULTATION ASSESSMENT AND PLAN:    58 yo male s/p multiple spinal surgeries (1- level ACDF 2000, 2-level ACDF 2002, 3-5 laminoplasty with C6 partial laminectomy in 5/2014, L3-4 decompression 6/2014, R L5 decompression 3/17 who presents with chronic neck pain, R C2 radiculopathy, R C8 radiculopathy, imbalance, blurry vision. Recent C7-T1 right TFESI improved arm pain. Medial branch block at right C2-3 did not improve pain and paresthesias.     Will obtain a CT cervical spine wo contrast and cervical upright XR   Recommend following up with his orthopedic surgeon as directed by his orthopedic surgeon   Will f/u with him after above completed       I spent more than 45 minutes in this apt, examining the pt, reviewing the scans, reviewing notes from chart, discussing treatment options with risks and benefits and coordinating care. >50 % clinic time was spent in face to face counseling and coordinating care    Isamar Gray       Cc:   Roman Ramos MD  [address] [facility] [address]      HPI: 58 yo male s/p multiple spinal surgeries (1- level ACDF 2000, 2-level ACDF 2002, 3-5 laminoplasty with C6 partial laminectomy in 5/2014, L3-4 decompression 6/2014, R L5 decompression 3/17 who presents with chronic neck pain, R C2 radiculopathy, C8 radiculopathy, imbalance, blurry vision. Patients ACDFs were originally due to radiculopathy that improved following each surgery. Patient has chronic neck pain for years. He more recently developed pain in the C8 distribution. The pain is worsened with bending and turning of the head and improved with ice and heat. He underwent at C7-T1 R TFESI with improvement in his right C8 radiculopathy. He also has finger numbness just in the 3-5 th digits of the right hand but not extending  into he hand or arm. He also had pain and paresthesias in the right C2 distribution. He has undergone medial branch block at C2-3 on the right without any relief. He feels like the entire right arm has gotten weaker over the years with some worsening over the last 1-2 years.     He denies bowel or bladder difficulties. He ambulates with a cane at baseline and has poor balance which he attirbutes paritally to his hip. Also has blurry vision on occasion. Recent MRI head negative.     Of note he has had over 4 hip surgeries on the left with the last being c/b infection. This required a prolonged course of IV abx as well as a abx spacer? The patient states he is suppose to make an appointment with his OS orthopedic surgeon in the next month for removal of the spacer.     Occupation: retired   Prior Spine Surgery: yes  Relief of Symptoms: partially  Number of Past Spine Surgeries: 5  Description of Surgeries: see below   2000- 2- level ACDF Dr. Chandler.   - arm radiculopathy. Improved following surgery   2002 - 1 level ACDF-Dr. Chandler.    arm radiculopathy returned. Some improvement   2014- C3-5 laminoplasty and partial laminectomy 5/14 by Dr Gutierrez for myelopathy   2014- L3-4 decompression by Dr Gutierrez  2017- R L5 decompression by Dr Gutierrez      Past Medical History:   Diagnosis Date     Anemia      Back pain with radiation     to legs     BPH (benign prostatic hyperplasia)      Cervical spondylosis      Degenerative disc disease, lumbar      Degenerative joint disease      DVT (deep venous thrombosis) (H)      GERD (gastroesophageal reflux disease)      History of anesthesia complications     C3-6 fusion     Hypertension     not currently on meds     Lumbar spondylosis      Neck pain      Vitamin D deficiency      Past Surgical History:   Procedure Laterality Date     APPENDECTOMY       C3-6 laminoplasty       CERVICAL FUSION  2000, 2004, 2014     CHOLECYSTECTOMY       HERNIA REPAIR Right      JOINT REPLACEMENT Left   "   TRINA     KIDNEY STONE SURGERY       LAMINOPLASTY Bilateral 5/19/2014    C3-5 laminoplasty with C6 partial laminectomy on 5/19/2014 by Dr. Sabrina Gutierrez for cervical myeloplathy     LEG SURGERY Bilateral     to correct \"bowed legs\"     LUMBAR LAMINECTOMY Bilateral 6/12/2014    L3 L4 laminectomy for stenosis, bilateral L34 medial facetectomy for lateral recess decompression, and bilateral L34 foraminotomy on 6/12/2014 by Dr. Sabrina Gutierrez.     LUMBAR LAMINECTOMY  3/8/2017    Procedure: RIGHT L5 ROOT DECOMPRESSION;  Surgeon: Sabrina Gutierrez MD;  Location: [facility];  Service:      NOSE SURGERY      polypectomy     SINUS SURGERY       REVIEW OF SYSTEMS:  ROS reviewed with pt as documented on pt health form of 7/20/2018.    Negative cardiac, pulmonary, hematological with exception of as listed in HPI.  No family hx of anesthetic reactions.  Hx of hypercoagulability- hx of calf dvt      MEDICATIONS:  Current Outpatient Prescriptions   Medication Sig Dispense Refill     acetaminophen (TYLENOL) 325 MG tablet Take 325 mg by mouth every 6 (six) hours as needed for pain.        ASCORBATE CALCIUM (VITAMIN C ORAL) Take 500 mg by mouth daily.        aspirin 81 MG EC tablet Take 81 mg by mouth.       calcium carbonate (OS-DANELLE) 600 mg (1,500 mg) tablet Take 600 mg by mouth daily.       celecoxib (CELEBREX) 100 MG capsule Take 100 mg by mouth 2 (two) times a day as needed for pain.        cyclobenzaprine (FLEXERIL) 10 MG tablet Take 10 mg by mouth 3 (three) times a day as needed for muscle spasms.       gabapentin (NEURONTIN) 300 MG capsule Take 900 mg by mouth 3 (three) times a day.        glucosamine-msm-magnesium-vitC cap Take by mouth.       HYDROcodone-acetaminophen 5-325 mg per tablet TAKE 1 TABLET BY MOUTH EVERY 4 HOURS IF NEEDED FOR PAIN MAX ACETAMINOPHEN DOSE: 4000 MG IN 24 HRS.  0     MULTIVITAMIN (MULTIPLE VITAMIN ORAL) Take 1 tablet by mouth daily.       omega-3/dha/epa/fish oil (FISH OIL-OMEGA-3 FATTY ACIDS) " "300-1,000 mg capsule Take 2 g by mouth daily.       ranitidine (ZANTAC) 150 MG tablet Take 150 mg by mouth 2 (two) times a day.        TAMSULOSIN HCL (TAMSULOSIN ORAL) Take 0.4 mg by mouth daily. After a meal       tiZANidine (ZANAFLEX) 4 MG tablet TAKE 1/2 TAB BY MOUTH TONIGHT. INCREASE BY 1/2 TAB EACH NIGHT AS TOLERATED UNTIL TAKING 4 TABS/NIGHT  2     traMADol (ULTRAM) 50 mg tablet Take 50 mg by mouth 4 (four) times a day.        No current facility-administered medications for this visit.          ALLERGIES/SENSITIVITIES:     Allergies   Allergen Reactions     Cat Dander Shortness Of Breath     Cephalexin Itching       PERTINENT SOCIAL HISTORY:   Social History     Social History     Marital status:      Spouse name: N/A     Number of children: N/A     Years of education: N/A     Social History Main Topics     Smoking status: Never Smoker     Smokeless tobacco: Never Used     Alcohol use No     Drug use: No     Sexual activity: Not Asked     Other Topics Concern     None     Social History Narrative         FAMILY HISTORY:  Father- stroke   Mother- heart failure, parkinson's,   Brother- MI, PE     PHYSICAL EXAM:   Constitutional: BP (!) 148/93  Pulse 85  Ht 5' 8.5\" (1.74 m)  Wt 184 lb (83.5 kg)  SpO2 99%  BMI 27.57 kg/m2     Mental Status: A & O in no acute distress.  Affect is appropriate.  Speech is fluent.  Recent and remote memory are intact.  Attention span and concentration are normal.     Cranial Nerves: CN1: grossly intact per patient recall. CN2: No funduscopic exam performed. CN3,4 & 6: Pupillary light response, lateral and vertical gaze normal.  No nystagmus.  Visual fields are full to confrontation. CN5: Intact to touch CN7: No facial weakness, smile, facial symmetry intact. CN8: Intact to spoken voice. CN9&10: Gag reflex, uvula midline, palate rises with phonation. CN11: Shoulder shrug 5/5 intact bilaterally. CN12: Tongue midline and moves freely from side to side.     Motor: No " pronator drift of upper extremity.   Motor  LUE 5/5  RUE del 4/5 bi 4+/5 tri 4/5 WE WF 4/5  4/5 intrinics 5/5  BLE 5/5     Sensory: Sensation intact bilaterally to light touch except diminished in R C2 distribution and in 3rd -5 th digits on the right hand     Coordination:  ambulates with a cane, unsteady gait      Reflexes; supinator, biceps, triceps, knee/ ankle jerk intact except 2+ on R and 1+ on L.  No hoffmans/ No    babinski/ clonus.      IMAGING: I personally reviewed all radiographic images    MRI: previous C3-7 anterior fusion and C3-6 laminoplasty, right C7-T1 foraminal narrowing and right C2-3 foraminal narrowing do not look significantly changed from prior imaging. Cervical cord is decompressed

## 2021-06-19 NOTE — PROGRESS NOTES
[facility] Certification Request    August 9, 2018      Patient: Liban Nava  MR Number: [id number]  YOB: 1960  Date of Visit: 8/9/2018      Dear Dr. Gray,    Thank you for this referral.   We are seeing Liban Nava for Physical Therapy of neural foraminal stenosis of cervical spine.    Medicare and/or Medicaid requires physician review and approval of the treatment plan. Please review the plan of care and verify that you agree with the therapy plan of care by co-signing this note.      Plan of Care  Authorization / Certification Start Date: 08/09/18  Authorization / Certification End Date: 11/09/18  Authorization / Certification Number of Visits: 12  Communication with: Referral Source  Patient Related Instruction: Nature of Condition;Treatment plan and rationale;Self Care instruction;Basis of treatment;Body mechanics;Posture;Precautions;Next steps;Expected outcome  Times per Week: 1-2  Number of Weeks: 10-12  Number of Visits: 12  Precautions / Restrictions : hx of cervical fusions  Therapeutic Exercise: ROM;Stretching;Strengthening  Neuromuscular Reeducation: posture;core  Manual Therapy: soft tissue mobilization;myofascial release;joint mobilization;muscle energy  Equipment: theraband  Carrying, Moving and Handling Objects Goal Status (): CJ    Goals:  Pt. will demonstrate/verbalize independence in self-management of condition in : 6 weeks  Pt. will be independent with home exercise program in : 6 weeks  Pt. will have improved quality of sleep: with less pain;waking less times/night;in 6 weeks  Patient will decrease : NDI score;by _ points;in 6 weeks  Pt will: have decreased numbness and tingling into R UE by at least 50% within 8 weeks in order to improve his QOL and function.  Pt will: have improved strength by 10# in order to improve  strength for meal prep within 8 weeks.      If you have any questions or concerns, please don't hesitate to  call.    Sincerely,      Cathy Brown, PT, DPT        Physician recommendation:     __x_ Follow therapist's recommendation        ___ Modify therapy      *Physician co-signature indicates they certify the need for these services furnished within this plan and while under their care.        [facility]   Cervical Thoracic Initial Evaluation    Patient Name: Liban Nava  Date of evaluation: 8/9/2018  Referral Diagnosis: Neural foraminal stenosis of cervical spine  Referring provider: Isamar Gray MD  Visit Diagnosis:     ICD-10-CM    1. Right cervical radiculopathy M54.12    2. Generalized muscle weakness M62.81    3. Poor posture R29.3        Assessment:      Liban Nava is a 57 y.o. male who presents to therapy today with chief complaints of chronic neck pain with symptoms worsening over the past 6 months. He is scheduled for his 5th left hip surgery at the end of September and is having a kidney stone procedure on August 13th.  Patient has a PMH that is positive for 1- level ACDF 2000, 2-level ACDF 2002, 3-5 laminoplasty with C6 partial laminectomy in 5/2014, L3-4 decompression 6/2014, R L5 decompression 3/17, chronic neck and low back pain, 4 surgeries on his left hip, HTN, GERD. Difficulty with turning his head, driving, sleeping, recreation, lifting, personal care, reading, food/meal prep, gripping objects, and all right arm movements due to pain, weakness, headaches, numbness, and tingling.  Pain symptoms are not improving.  Patient demonstrates signs and sx consistent with right cervical radiculopathy, myofascial pain, and poor posture. PT POC and goals have been discussed with patient and He  is agreeable to these. Patient appears motivated for physical therapy and is appropriate for skilled therapy services.    The POC is dynamic and will be modified on an ongoing basis.  Barriers to achieving goals as noted in the assessment section may affect outcome.  Prognosis to achieve  goals is  good   Pt. is appropriate for skilled PT intervention as outlined in the Plan of Care (POC).  Pt. is a good candidate for skilled PT services to improve pain levels and function.     Plan of care and goals were established in collaboration with patient.     Goals:  Pt. will demonstrate/verbalize independence in self-management of condition in : 6 weeks  Pt. will be independent with home exercise program in : 6 weeks  Pt. will have improved quality of sleep: with less pain;waking less times/night;in 6 weeks  Patient will decrease : NDI score;by _ points;in 6 weeks  Pt will: have decreased numbness and tingling into R UE by at least 50% within 8 weeks in order to improve his QOL and function.  Pt will: have improved strength by 10# in order to improve  strength for meal prep within 8 weeks.    Patient's expectations/goals are realistic.    Barriers to Learning or Achieving Goals:  Chronicity of the problem.  Co-morbidities or other medical factors.  see PMH       Plan / Patient Instructions:        Plan of Care:   Authorization / Certification Start Date: 08/09/18  Authorization / Certification End Date: 11/09/18  Authorization / Certification Number of Visits: 12  Communication with: Referral Source  Patient Related Instruction: Nature of Condition;Treatment plan and rationale;Self Care instruction;Basis of treatment;Body mechanics;Posture;Precautions;Next steps;Expected outcome  Times per Week: 1-2  Number of Weeks: 10-12  Number of Visits: 12  Precautions / Restrictions : hx of cervical fusions  Therapeutic Exercise: ROM;Stretching;Strengthening  Neuromuscular Reeducation: posture;core  Manual Therapy: soft tissue mobilization;myofascial release;joint mobilization;muscle energy  Equipment: theraband  Carrying, Moving and Handling Objects Goal Status (): CJ     Exercises:  Exercise #1: radial nerve tensioners w/o head involvement  Comment #1: x20 on R  Exercise #2: ulnar nerve glides-  butterfly  Comment #2: x20 B  Exercise #3: nerve tensioners at 45 degrees abd w/o head involvement and hands together   Comment #3: x20 B     Plan for next visit: MFR R UT, check 1st rib, scap sets, pec stretch in supine     Subjective:         Social information:   Occupation: disabled   Equipment Available: SE cane    History of Present Illness:    Liban is a 57 y.o. male who presents to therapy today with complaints of chronic right sided neck pain w/ numbness into the right side of his face and behind his ear. He also has pain and numbness into his right UE. Going to try a radiofrequency but needs hip revised first. Going to have kidney stones removed on Monday. Symptoms are constant and getting worse.     Having his 5th L hip revision on .     Pain Ratin  Pain rating at best: 4  Pain rating at worst: 10  Pain description: headaches, numbness, tingling, weakness    Functional limitations are described as occurring with: turning his head, driving, sleeping, recreation, lifting, personal care, reading, food/meal prep, gripping objects, and all right arm movements      Patient reports benefit from:  heat, cold         Objective:      Note: Items left blank indicates the item was not performed or not indicated at the time of the evaluation.    Patient Outcome Measures :    Neck Disability Score in %: 66   Scores range from 0-100%, where a score of 0% represents minimal pain and maximal function. The minmal clinically important difference is a score reduction of 10%.    Cervical Thoracic Examination  1. Right cervical radiculopathy     2. Generalized muscle weakness     3. Poor posture       Involved side: Right  Posture Observation: poor. Protracted head and shoulders    Balance not assessed due to patient having surgery on his hip next month. Would like to focus on his neck/UE in order to prevent neck surgery.    Cervical ROM:    Date: 2018     *Indicate scale AROM AROM AROM   Cervical  Flexion Severely limited, increase in R sided face numbness and pain into R UE     Cervical Extension Severely limited, increase in R sided neck pain      Right Left Right Left Right Left   Cervical Sidebending Mod-severely limited, increase in R sided neck and arm pain Mod limited, pain-free       Cervical Rotation Mod-severely limited, increase in R sided neck and arm pain Mod limited, pain-free       Cervical Protraction      Cervical Retraction      Thoracic Flexion      Thoracic Extension      Thoracic Sidebending         Thoracic Rotation           Strength     Date: 8/9/2018     Cervical Myotomes/5 Right (3+/5)- limited by weakness Left (5/5) Right Left Right Left   Cervical Flexion (C1-2)         Cervical Sidebending (C3)         Shoulder Elevation (C4)         Shoulder Abduction (C5)         Elbow Flexion (C6)         Elbow Extension (C7)         Wrist Flexion (C7)         Wrist Extension (C6)         Thumb abduction (C8)         Finger Abduction (T1)          R : 35, 30, 27#  L : 85, 86, 83#  Sensation   Not intact in R UE per subjective    R shoulder:    Flexion- 160, limited by pain and weakness   abd- 170, limited by pain and weakness   ER: limited by about 20 degrees, limited by pain and weakness into R UT   IR: limited to L1 due to pain and weakness in R UT area      Cervical Special Tests     Cervical Special Tests Right Left UE Nerve Mobility Right Left   Cervical compression   Median nerve + -   Cervical distraction   Ulnar nerve + -   Spurling s test   Radial nerve + -   Shoulder abduction sign   Thoracic outlet     Deep neck flexor endurance test   Rajwinder     Upper cervical rotation   Adson s     Sharper-Yogesh   Cervical rotation lateral flexion     Alar ligament test   Other:     Other:   Other:         Treatment Today     TREATMENT MINUTES COMMENTS   Evaluation 25 -cervical spine   Self-care/ Home management     Manual therapy     Neuromuscular Re-education     Therapeutic Activity      Therapeutic Exercises 25 Discussed PT POC and pathology of condition. Answered patient questions. Began HEP-see flowsheet. Recommend patient ice daily. Instructed patient on proper posture and pillow recommendations.   Gait training     Modality__________________                Total 50    Blank areas are intentional and mean the treatment did not include these items.     PT Evaluation Code: (Please list factors)  Patient History/Comorbidities: 1- level ACDF 2000, 2-level ACDF 2002, 3-5 laminoplasty with C6 partial laminectomy in 5/2014, L3-4 decompression 6/2014, R L5 decompression 3/17, chronic neck and low back pain, 4 surgeries on his left hip, HTN, GERD  Examination: chronic R cervical radiculopathy  Clinical Presentation: stable  Clinical Decision Making: low    Patient History/  Comorbidities Examination  (body structures and functions, activity limitations, and/or participation restrictions) Clinical Presentation Clinical Decision Making (Complexity)   No documented Comorbidities or personal factors 1-2 Elements Stable and/or uncomplicated Low   1-2 documented comorbidities or personal factor 3 Elements Evolving clinical presentation with changing characteristics Moderate   3-4 documented comorbidities or personal factors 4 or more Unstable and unpredictable High            Cathy Brown, PT, DPT  8/9/2018  2:02 PM

## 2021-06-19 NOTE — PROGRESS NOTES
Assessment:   Liban Nava is a 57 y.o. y.o. male with past medical history significant for GERD, hypertension who presents today for follow-up regarding chronic and progressive right neck pain with radiation to the right upper extremity in the distribution of the right C8 nerve root.  Patient has a history of a cervical fusion from C3 through C7 as well as a cervical laminoplasty C3 through C5.  MRI cervical spine shows moderately severe bilateral foraminal stenosis at C7-T1.  The patient status post a right C7-T1 transforaminal epidural steroid injection on June 1, 2018 which provided 80% relief of his right arm symptoms, but only lasted about 1 month.  That pain is returning.  Patient had continued to have significant right upper axial neck pain along with pain radiating into the head and right face with numbness and tingling in the right jaw and ear.  Patient failed a right C2, C3 medial branch block.  A right occipital nerve block on Jaleesa 15, 2018 did not provide any relief of his pain.  I then performed trigger point injections which provided 20% relief of his pain but only lasted one day.       Plan:     A shared decision making plan was used.  The patient's values and choices were respected.  The following represents what was discussed and decided upon by the physician assistant and the patient.      1.  DIAGNOSTIC TESTS: I reviewed the MRI cervical spine.  No further diagnostic tests ordered.    2.  PHYSICAL THERAPY: No further physical therapy was ordered.  Patient prepped to be in physical therapy for his neck about 1 year ago.  I encouraged him to continue doing his home exercises.    3.  MEDICATIONS:  No changes are made to the patient's medications.  He uses the following medications:  --Tramadol 50 mg 1 tab three times a day   --Vicodin 5-325 mg 1-2 tab at bedtime  --Tizanidine 4 mg 4 tabs at bedtime per pt  --Gabapentin 300 mg (3-3-3)  --Flexeril 10 mg PRN  --Celebrex 100 mg 1 tab two times a day  "  --Tylenol 500 mg 1 tab Q8H PRN    4.  INTERVENTIONS: No further interventions were ordered.    5.  REFERRALS: I called Dr. Gutierrez and spoke directly at 10:00 regarding this patient.  This patient saw Dr. Gutierrez on May 3, 2018.  According to her note, she \"did not detect anything surgical that she could do to help him.\"  Dr. Gutierrez feels it is reasonable for the patient to seek a second surgical opinion, given the complexity of his case.  I will refer the patient to see Dr. Gray.    6.  FOLLOW-UP: Patient follow-up with me as needed.  We will await recommendations from neurosurgery.  If he has any questions or concerns, he should not hesitate to call.    Subjective:     Liban Nava is a 57 y.o. male who presents today for follow-up regarding neck pain and right upper extremity pain.  Patient status post trigger point injections on June 29, 2018.  Patient reports this injection provided 20% relief of his pain but only lasted one day.  His right upper extremity pain had been under good control following a right C7-T1 transforaminal epidural steroid injection on June 1, 2018.  Unfortunately, the pain is returning.    Patient continues to complain of right neck pain.  Pain radiates in the right shoulder, down the right triceps, into the ulnar forearm, affecting digits 4 and 5.  He continues to feel pain which radiates up into the head.  He has numbness and tingling and a sensation of worms crawling under his skin along the right jaw and ear.  He rates his pain today as a 7 out of 10.  At its best it is a 4 out of 10.  At its worst it is a 10 out of 10.  Patient's pain is aggravated with flexing his neck and tilting it to the right.  He denies any new symptoms since he was last seen.  He feels weak in the right arm.    Patient has had physical therapy for his neck, most recently in June 2017.  Uses tramadol 50 mg 3 times daily, Vicodin 1-2 tabs at bedtime, tizanidine at bedtime, gabapentin 300 mg 3 times daily, " cyclobenzaprine 10 mg as needed, Celebrex 100 mg twice daily, and Tylenol 500 mg as needed.    Past medical history is reviewed and is unchanged in the interim.    Family history is reviewed and is unchanged in the interim.    Review of Systems:  Positive for numbness/gently, weakness, headache, nausea/vomiting, balance changes.  Negative for loss of bowel/bladder control, footdrop, dizziness, blurry vision.     Objective:   CONSTITUTIONAL:  Vital signs as above.  No acute distress.  The patient is well nourished and well groomed.    PSYCHIATRIC:  The patient is awake, alert, oriented to person, place and time.  The patient is answering questions appropriately with clear speech.  Normal affect.  HEENT: Normocephalic, atraumatic.  Sclera clear.  Neck is supple.  SKIN:  Skin over the face, neck bilateral upper extremities is clean, dry, intact without rashes.  MUSCULOSKELETAL: The patient has breakaway weakness right shoulder abductors and right wrist extensors, 4/5 strength right finger abductors, 5/5 strength right elbow flexors, extensors, and finger flexors.  Patient has 5/5 strength left upper extremity throughout.    NEUROLOGICAL:    Sensation to light touch is intact over bilateral upper extremities throughout.    RESULTS:    I reviewed an MRI cervical spine from [facility] dated October 6, 2017.  This shows postoperative changes of an interbody fusion from C3 through C7 which is solid.  There is an anterior plate and screw device at C4-5.  At C2-3 there is severe right facet arthritis and mild foraminal stenosis.  At C7-T1 there is severe bilateral facet arthropathy with 3 mm of degenerative anterolisthesis, moderately severe disc degeneration, and moderate to severe bilateral foraminal stenosis.  Please see report for further details.      I also reviewed the MRI of brain with and without contrast from [facility] dated May 16, 2018.  This is normal.  No acute infarct, mass, or hemorrhage.

## 2021-06-19 NOTE — PROGRESS NOTES
Liban is here to f/u on imaging and orthopedist f/u. He states he did see his ortho doctor and is having her left hip replaced for the fifth time this September.   NDI today is 64%  KING Bullock

## 2021-06-19 NOTE — PROGRESS NOTES
Pt is her for consult for cervical. Right neck it goes behind his right ear and his check and right arm to his last three fingers. Numbness and tingling on his right ear and checks and also his last three fingers.balance issue. Had 2 injections nothing helped.   NDI:64%  M RENNY Saucedo

## 2021-06-19 NOTE — PROGRESS NOTES
[facility] Daily Progress     Patient Name: Liban Nava  Date: 8/15/2018  Visit #: 2  PTA visit #:  -  Referral Diagnosis: neural foraminal stenosis  Referring provider: Isamar Gray  Visit Diagnosis:     ICD-10-CM    1. Right cervical radiculopathy M54.12    2. Generalized muscle weakness M62.81    3. Poor posture R29.3    4. Status post lumbar spine operation Z98.890    5. Difficulty walking R26.2    6. Unsteadiness on feet R26.81    7. Cervical radiculopathy at C8 M54.12        Past Medical History:   Diagnosis Date     Anemia      Back pain with radiation     to legs     BPH (benign prostatic hyperplasia)      Cervical spondylosis      Degenerative disc disease, lumbar      Degenerative joint disease      DVT (deep venous thrombosis) (H)      GERD (gastroesophageal reflux disease)      History of anesthesia complications     C3-6 fusion     Hypertension     not currently on meds     Lumbar spondylosis      Neck pain      Vitamin D deficiency          Assessment:   Patient comes in for first f/u PT visit today. He feels that he's getting worse since beginning PT exercises. His HEP was modified today to see if this helps. We discussed that we may need to continue modifying his HEP in future visits. We also discussed that he's had this condition for quite some time so it will not be a quick fix in therapy. Patient understanding of this today.    HEP/POC compliance is  good .  Patient demonstrates understanding/independence with home program.  Patient is benefitting from skilled physical therapy and is making steady progress toward functional goals.  Patient is appropriate to continue with skilled physical therapy intervention, as indicated by initial plan of care.    Goal Status:  Pt. will demonstrate/verbalize independence in self-management of condition in : 6 weeks  Pt. will be independent with home exercise program in : 6 weeks  Pt. will have improved quality of sleep: with less pain;waking  less times/night;in 6 weeks  Patient will decrease : NDI score;by _ points;in 6 weeks  Pt will: have decreased numbness and tingling into R UE by at least 50% within 8 weeks in order to improve his QOL and function.  Pt will: have improved strength by 10# in order to improve  strength for meal prep within 8 weeks.    Plan / Patient Education:     Continue with initial plan of care.  Progress with home program as tolerated.    Exercises:  Exercise #1: radial nerve tensioners w/o head involvement  Comment #1: x20 on R  Exercise #2: ulnar nerve glides- butterfly (HOLD)  Comment #2: x20 B  Exercise #3: nerve tensioners at 45 degrees abd w/o head involvement and hands together   Comment #3: x20 B    Plan for next visit: Rock tool R UT, check 1st rib, how are new exs going? Check  in R hand    Subjective:     Pain Rating:     Kidney stone procedure on Monday went well. Feels his neck is getting worse. Getting more headaches. Having difficulty lifting head up for sleeping      Objective:     Tender R UT, scalene, rhomboid, middle trap  Poor posture  Rock tools to R UT  No increase in pain after MT today        Treatment Today     TREATMENT MINUTES COMMENTS   Evaluation     Self-care/ Home management     Manual therapy 16 Rock tool to R UT, scalene, rhomboid, middle trap   Neuromuscular Re-education     Therapeutic Activity     Therapeutic Exercises 10 Discussed progress. Objective measures taken. Progressed and modified HEP- see flow sheet for details.   Gait training     Modality__________________                Total 26    Blank areas are intentional and mean the treatment did not include these items.       Cathy Brown, PT, DPT  8/15/2018

## 2021-06-19 NOTE — PROGRESS NOTES
Assessment:   Liban Nava is a 57 y.o. y.o. male with past medical history significant for GERD, hypertension who presents today for follow-up regarding chronic and progressive right neck pain with radiation into the right upper extremity in the distribution of the right C8 nerve root.  Patient is a history of a cervical fusion from C3 through C7 as well as cervical laminoplasty C3 through C5.  MRI cervical spine shows moderately severe bilateral foraminal stenosis at C7-T1.  Patient status post a right C7-T1 transforaminal epidural steroid injection on June 1, 2018 which has provided 80% relief of his right arm symptoms.  He continues to have significant right upper axial neck pain along with pain radiating into the head and face with numbness and tingling in the cheek and right ear.  Patient failed a right C2, C3 medial branch block.  He is status post right occipital nerve block on Jaleesa 15, 2018 which did not provide any relief of his pain.  He does seem to have a significant component of myofascial pain.       Plan:     A shared decision making plan was used.  The patient's values and choices were respected.  The following represents what was discussed and decided upon by the physician assistant and the patient.      1.  DIAGNOSTIC TESTS: I reviewed the MRI cervical spine.  No further diagnostic tests were ordered.    2.  PHYSICAL THERAPY: The patient has had physical therapy for his neck most recently in June 2017.  I encouraged him to continue doing his home exercises.    3.  MEDICATIONS: No changes are made to the patient's medications.  He uses the following medications:  --Tramadol 50 mg 1 tab three times a day   --Vicodin 5-325 mg 1-2 tab at bedtime  --Tizanidine 4 mg 4 tabs at bedtime per pt  --Gabapentin 300 mg (3-3-3)  --Flexeril 10 mg PRN  --Celebrex 100 mg 1 tab two times a day   --Tylenol 500 mg 1 tab Q8H PRN    4.  INTERVENTIONS: A right upper trapezius trigger point injection was performed  today.  Please see separate procedure note for details.    5.  PATIENT EDUCATION: Patient is in agreement with the above plan.  All questions were answered.    6.  FOLLOW-UP: I will see patient back in clinic in 2 weeks to see how he is responded to the trigger point injection.  If he has any questions or concerns in the meantime, he should not hesitate to contact our clinic.    Subjective:     Liban Nava is a 57 y.o. male who presents today for follow-up regarding chronic and progressive right neck pain and right upper extremity pain and paresthesias.  Patient's right upper extremity symptoms continue to be under good control after his right C7-T1 transforaminal epidural steroid injection.  I last saw the patient on Jaleesa 15, 2018.  That was after he failed a right C2, C3 medial branch block.  I performed a right occipital nerve block on Jaleesa 15, 2018.  This has not provided any relief of the patient's pain.    Patient continues to complain of right neck pain.  Pain is most severe in the upper cervical spine, at the base of the skull.  Pain extends into the upper trapezius muscle.  He also continues to have numbness and tingling around the right ear and jaw.  He rates his pain today as a 6 out of 10.  At its best it is a 4 out of 10.  At its worst it is an 8 out of 10.  Pain is aggravated with moving his head.  It is alleviated with applying heat and ice.  He denies any new symptoms since he was last seen.    Patient has had physical therapy for his neck, most recently in June 2017.  Uses tramadol 50 mg 3 times daily, Vicodin 1-2 tabs at bedtime, tizanidine at bedtime, gabapentin 300 mg 3 times daily, cyclobenzaprine 10 mg as needed, Celebrex 100 mg twice daily, and Tylenol 500 mg as needed.    Past medical history is reviewed and is unchanged in the interim.    Family history is reviewed and is unchanged in the interim.    Review of Systems:  Positive for numbness/healing, headache, dizziness, blurry vision,  balance changes.  Negative for loss of bowel/bladder control, footdrop, weakness, nausea/vomiting.     Objective:   CONSTITUTIONAL:  Vital signs as above.  No acute distress.  The patient is well nourished and well groomed.    PSYCHIATRIC:  The patient is awake, alert, oriented to person, place and time.  The patient is answering questions appropriately with clear speech.  Normal affect.  HEENT: Normocephalic, atraumatic.  Sclera clear.  Anterior neck is supple.  SKIN:  Skin over the face, neck bilateral upper extremities is clean, dry, intact without rashes.  MUSCULOSKELETAL: The patient has 4/5 strength right finger abductors, otherwise 5/5 strength for the bilateral shoulder abductors, elbow flexors/extensors, wrist extensors, finger flexors.  Patient does have hypertonicity in the right upper trapezius muscle and right cervical paraspinous muscles.  Patient has taught bands in the right upper trapezius muscle.  NEUROLOGICAL:   Sensation to light touch is intact over bilateral upper extremities throughout.    RESULTS:  I reviewed an MRI cervical spine from [facility] dated October 6, 2017.  This shows postoperative changes of an interbody fusion from C3 through C7 which is solid.  There is an anterior plate and screw device at C4-5.  At C2-3 there is severe right facet arthritis and mild foraminal stenosis.  At C7-T1 there is severe bilateral facet arthropathy with 3 mm of degenerative anterolisthesis, moderately severe disc degeneration, and moderate to severe bilateral foraminal stenosis.  Please see report for further details.     I also reviewed the MRI of brain with and without contrast from [facility] dated May 16, 2018.  This is normal.  No acute infarct, mass, or hemorrhage.

## 2021-06-19 NOTE — PROGRESS NOTES
Procedures  Pre-procedure diagnosis: Right upper trapezius trigger points, myofascial pain.  Post-procedure diagnosis:  Same    Procedure: Right upper trapezius trigger point injections (no steroid).    The risks and benefits of the procedure were discussed with the patient which included infection, bleeding, pneumothorax, damage to surrounding structures/tissues.  The patient gave both verbal and written consent to proceed.    The trigger points were palpated and marked with indelible marking pen.  The areas to be injected today are:  1.  Right upper trapezius muscle at the midclavicular line.  2.  Right upper trapezius muscle at the level of C2 2 fingerbreadths lateral to midline.    The area over the first cecille was cleansed with an alcohol swab.  Using a 27 guage 1.25 inch needle, the trigger point was dry needled.  Several muscle twitches were felt  and the patient reported reproduction of pain.  After aspiration was negative 1 ml of 1% Lidocaine was injected.  This was repeated at 1 other spots.  There was no bleeding afterwards but a bandaid was placed to prevent oozing of fluid onto the clothes.      The patient's right upper trapezius muscle was stretched.  The patient was shown how to stretch this muscle. Stretching should be performed at least 3 times a day, holding the stretch for at least 60 seconds.   After a short period of observation the patient was discharged of their own power.

## 2021-06-20 NOTE — PROGRESS NOTES
[facility] Daily Progress     Patient Name: Liban Nava  Date: 8/29/2018  Visit #: 6  PTA visit #:  -  Referral Diagnosis: neural foraminal stenosis  Referring provider: Isamar Gray  Visit Diagnosis:     ICD-10-CM    1. Right cervical radiculopathy M54.12    2. Generalized muscle weakness M62.81    3. Poor posture R29.3    4. Status post lumbar spine operation Z98.890    5. Difficulty walking R26.2    6. Unsteadiness on feet R26.81    7. Cervical radiculopathy at C8 M54.12    8. History of fusion of cervical spine Z98.1        Past Medical History:   Diagnosis Date     Anemia      Back pain with radiation     to legs     BPH (benign prostatic hyperplasia)      Cervical spondylosis      Degenerative disc disease, lumbar      Degenerative joint disease      DVT (deep venous thrombosis) (H)      GERD (gastroesophageal reflux disease)      History of anesthesia complications     C3-6 fusion     Hypertension     not currently on meds     Lumbar spondylosis      Neck pain      Vitamin D deficiency          Assessment:   Patient continues to make progress with his R  strength and has slight improvement with his headaches/migraines. His tingling/numbness hasn't changed much since last session. He got approved for an injection and is going to get that scheduled before his hip surgery.    HEP/POC compliance is  good .  Patient demonstrates understanding/independence with home program.  Patient is benefitting from skilled physical therapy and is making steady progress toward functional goals.  Patient is appropriate to continue with skilled physical therapy intervention, as indicated by initial plan of care.    Goal Status (progressing towards):  Pt. will demonstrate/verbalize independence in self-management of condition in : 6 weeks  Pt. will be independent with home exercise program in : 6 weeks  Pt. will have improved quality of sleep: with less pain;waking less times/night;in 6 weeks  Patient will  "decrease : NDI score;by _ points;in 6 weeks  Pt will: have decreased numbness and tingling into R UE by at least 50% within 8 weeks in order to improve his QOL and function.  Pt will: have improved strength by 10# in order to improve  strength for meal prep within 8 weeks.    Plan / Patient Education:     Continue with initial plan of care.  Progress with home program as tolerated.    Exercises:  Exercise #1: radial nerve tensioners w/o head involvement  Comment #1: x20 on R  Exercise #2: ulnar nerve glides- butterfly (HOLD)  Comment #2: x20 B  Exercise #3: nerve tensioners at 45 degrees abd w/o head involvement and hands together   Comment #3: x20 B  Exercise #4: supine pec stretch  Comment #4: 30\" holds  Exercise #5: scap sets  Comment #5: x10 with 5\" holds  Exercise #6: R scalene and UT stretch w/o overpressure  Comment #6: 5-15\" holds, x3  Exercise #7: blue foam squeezes and finger pinches  Exercise #8: cervical isometric  Comment #8: x10 with 5\" holds- all 4 directions    Plan for next visit: Rock tool R UT, Check  in R hand. When is injection? Go over goals. % progress    Subjective:     Pain Rating: Has a headache right now    Got approved for injection in neck. Compliant with HEP and icing. Numbness/tingling is about the same.       Objective:     Tender R UT, scalene, rhomboid, middle trap  Posture is slightly improved  No increase in pain after MT today  R : 68, 65, 65#     Avg  strength on R first day of therapy: 30.66        Treatment Today     TREATMENT MINUTES COMMENTS   Evaluation     Self-care/ Home management     Manual therapy 15 Rock tool to R UT, scalene, rhomboid, middle trap   Neuromuscular Re-education     Therapeutic Activity     Therapeutic Exercises 9 Discussed progress, appt with Gia, and injection. Objective measures taken.     Gait training     Modality__________________                Total 24    Blank areas are intentional and mean the treatment did not include these " items.       Cathy Brown, PT, DPT  8/29/2018

## 2021-06-20 NOTE — PROGRESS NOTES
[facility] Daily Progress     Patient Name: Liban Nava  Date: 8/24/2018  Visit #: 5  PTA visit #:  -  Referral Diagnosis: neural foraminal stenosis  Referring provider: Isamar Gray  Visit Diagnosis:     ICD-10-CM    1. Right cervical radiculopathy M54.12    2. Generalized muscle weakness M62.81    3. Poor posture R29.3        Past Medical History:   Diagnosis Date     Anemia      Back pain with radiation     to legs     BPH (benign prostatic hyperplasia)      Cervical spondylosis      Degenerative disc disease, lumbar      Degenerative joint disease      DVT (deep venous thrombosis) (H)      GERD (gastroesophageal reflux disease)      History of anesthesia complications     C3-6 fusion     Hypertension     not currently on meds     Lumbar spondylosis      Neck pain      Vitamin D deficiency          Assessment:   Patient feels 50% better since beginning PT. H is having less numbness into his R UE and also has increased  strength by 15# in his R UE. He notices increased symptoms and neck pain when he's active. I think his significant dependence on his R UE for ambulation due to severe L hip pain is playing a role in his symptoms. I'm hoping that after his hip surgery, his neck will feel better due to being able to bear weight in his left leg with less pain.     HEP/POC compliance is  good .  Patient demonstrates understanding/independence with home program.  Patient is benefitting from skilled physical therapy and is making steady progress toward functional goals.  Patient is appropriate to continue with skilled physical therapy intervention, as indicated by initial plan of care.    Goal Status:  Pt. will demonstrate/verbalize independence in self-management of condition in : 6 weeks  Pt. will be independent with home exercise program in : 6 weeks  Pt. will have improved quality of sleep: with less pain;waking less times/night;in 6 weeks  Patient will decrease : NDI score;by _ points;in 6  "weeks  Pt will: have decreased numbness and tingling into R UE by at least 50% within 8 weeks in order to improve his QOL and function.  Pt will: have improved strength by 10# in order to improve  strength for meal prep within 8 weeks.    Plan / Patient Education:     Continue with initial plan of care.  Progress with home program as tolerated.    Exercises:  Exercise #1: radial nerve tensioners w/o head involvement  Comment #1: x20 on R  Exercise #2: ulnar nerve glides- butterfly (HOLD)  Comment #2: x20 B  Exercise #3: nerve tensioners at 45 degrees abd w/o head involvement and hands together   Comment #3: x20 B  Exercise #4: supine pec stretch  Comment #4: 30\" holds  Exercise #5: scap sets  Comment #5: x10 with 5\" holds  Exercise #6: R scalene and UT stretch w/o overpressure  Comment #6: 5-15\" holds, x3  Exercise #7: blue foam squeezes and finger pinches  Exercise #8: cervical isometric  Comment #8: x10 with 5\" holds- all 4 directions    Plan for next visit: Rock tool R UT, Check  in R hand. How are exs going?    Subjective:     Pain Rating:     Still getting headaches. Wondering what appt he has on Monday. Head feels heavy. Numbness/tingling into R hand and UE is better. Only getting numbness into R UE about 50% of his day now. R side of face has improved as well. Feels 50% better since beginning PT.       Objective:     Tender R UT, scalene, rhomboid, middle trap  Posture is slightly improved  No increase in pain after MT today  R : 46, 46, 42#     Avg  strength on R first day of therapy: 30.66        Treatment Today     TREATMENT MINUTES COMMENTS   Evaluation     Self-care/ Home management     Manual therapy 15 Rock tool to R UT, scalene, rhomboid, middle trap   Neuromuscular Re-education     Therapeutic Activity     Therapeutic Exercises 17 Discussed progress. Objective measures taken. Progressed HEP- see flow sheet for details.    Gait training     Modality__________________              "   Total 32    Blank areas are intentional and mean the treatment did not include these items.       Cathy Brown, PT, DPT  8/24/2018

## 2021-06-20 NOTE — PROGRESS NOTES
[facility] Daily Progress/Discharge summary     Patient Name: Liban Nava  Date: 9/19/2018  Visit #: 8  PTA visit #:  -  Referral Diagnosis: neural foraminal stenosis  Referring provider: Isamar Gray  Visit Diagnosis:     ICD-10-CM    1. Right cervical radiculopathy M54.12    2. Generalized muscle weakness M62.81    3. Poor posture R29.3        Past Medical History:   Diagnosis Date     Anemia      Back pain with radiation     to legs     BPH (benign prostatic hyperplasia)      Cervical spondylosis      Degenerative disc disease, lumbar      Degenerative joint disease      DVT (deep venous thrombosis) (H)      GERD (gastroesophageal reflux disease)      History of anesthesia complications     C3-6 fusion     Hypertension     not currently on meds     Lumbar spondylosis      Neck pain      Vitamin D deficiency          Assessment:   Patient feels 50% better overall since beginning PT. His injection significantly helped his headaches and neck pain. His exercises seem to be helping the tingling and pain into his R UE. His neural tension testing has significantly improved since beginning therapy, although his nerves are still irritated. Patient's right  strength has more than doubled since initial evaluation. Patient is undergoing a left hip revision next week so he will discharge today from therapy. Some of his goals are met. He is encouraged to continue his HEP, even after his hip surgery. Patient is agreeable to this plan.    HEP/POC compliance is  good .  Patient demonstrates understanding/independence with home program.    Goal Status:  Pt. will demonstrate/verbalize independence in self-management of condition in : 6 weeks;Met  Pt. will be independent with home exercise program in : 6 weeks;Met  Pt. will have improved quality of sleep: with less pain;waking less times/night;in 6 weeks;Met (muscle relaxor helps)  Patient will decrease : NDI score;by _ points;in 6 weeks  Pt will: have  "decreased numbness and tingling into R UE by at least 50% within 8 weeks in order to improve his QOL and function. (partially met- better but a little less than 50% progress.)  Pt will: have improved strength by 10# in order to improve  strength for meal prep within 8 weeks. (goal MET)    Plan / Patient Education:     Continue with initial plan of care.  Progress with home program as tolerated.    Exercises:  Exercise #1: radial nerve tensioners w/ head involvement  Comment #1: x20 on R  Exercise #2: ulnar nerve glides on ear  Comment #2: x20 B  Exercise #3: nerve tensioners at 145 degrees abd w/o head involvement and hands together   Comment #3: x20 B  Exercise #4: supine pec stretch  Comment #4: 30\" holds  Exercise #5: scap sets  Comment #5: x10 with 5\" holds  Exercise #6: R scalene and UT stretch w/o overpressure  Comment #6: 5-15\" holds, x3  Exercise #7: blue foam squeezes and finger pinches  Exercise #8: cervical isometric  Comment #8: x10 with 5\" holds- all 4 directions    Subjective:     Pain Rating:     Feels that he's made a lot of progress thus far. No headaches since injection. Feels that he's made at least 50% progress since beginning PT.       Objective:     Significantly less adhesions into myofascial area in neck  Posture is slightly improved  No increase in pain after MT today  R : 75, 82, 69#   +median, ulnar, and radial nerve tension testing but significantly improved  Neck Disability Score in %: 22    Avg  strength on R first day of therapy: 30.66      Treatment Today     TREATMENT MINUTES COMMENTS   Evaluation     Self-care/ Home management     Manual therapy 10 Rock tool to R UT, scalene, rhomboid, middle trap   Neuromuscular Re-education     Therapeutic Activity     Therapeutic Exercises 20 Discussed progress,goals and D/C plans. Objective measures taken   Gait training     Modality__________________                Total 30    Blank areas are intentional and mean the treatment did " not include these items.       Cathy Brown, PT, DPT  9/19/2018

## 2021-06-20 NOTE — PROGRESS NOTES
[facility] Daily Progress     Patient Name: Liban Nava  Date: 8/22/2018  Visit #: 4  PTA visit #:  -  Referral Diagnosis: neural foraminal stenosis  Referring provider: Isamar Gray  Visit Diagnosis:     ICD-10-CM    1. Right cervical radiculopathy M54.12    2. Generalized muscle weakness M62.81    3. Poor posture R29.3        Past Medical History:   Diagnosis Date     Anemia      Back pain with radiation     to legs     BPH (benign prostatic hyperplasia)      Cervical spondylosis      Degenerative disc disease, lumbar      Degenerative joint disease      DVT (deep venous thrombosis) (H)      GERD (gastroesophageal reflux disease)      History of anesthesia complications     C3-6 fusion     Hypertension     not currently on meds     Lumbar spondylosis      Neck pain      Vitamin D deficiency          Assessment:   Patient is having less numbness into his R UE. He also has increased  strength by 13# in his R UE. He notices increased symptoms and neck pain when he's active. I think his significant dependence on his R UE for ambulation due to severe L hip pain is playing a role in his symptoms. I'm hoping that after his hip surgery, his neck will feel better due to being able to bear weight in his left leg with less pain.     HEP/POC compliance is  good .  Patient demonstrates understanding/independence with home program.  Patient is benefitting from skilled physical therapy and is making steady progress toward functional goals.  Patient is appropriate to continue with skilled physical therapy intervention, as indicated by initial plan of care.    Goal Status:  Pt. will demonstrate/verbalize independence in self-management of condition in : 6 weeks  Pt. will be independent with home exercise program in : 6 weeks  Pt. will have improved quality of sleep: with less pain;waking less times/night;in 6 weeks  Patient will decrease : NDI score;by _ points;in 6 weeks  Pt will: have decreased numbness  "and tingling into R UE by at least 50% within 8 weeks in order to improve his QOL and function.  Pt will: have improved strength by 10# in order to improve  strength for meal prep within 8 weeks.    Plan / Patient Education:     Continue with initial plan of care.  Progress with home program as tolerated.    Exercises:  Exercise #1: radial nerve tensioners w/o head involvement  Comment #1: x20 on R  Exercise #2: ulnar nerve glides- butterfly (HOLD)  Comment #2: x20 B  Exercise #3: nerve tensioners at 45 degrees abd w/o head involvement and hands together   Comment #3: x20 B  Exercise #4: supine pec stretch  Comment #4: 30\" holds  Exercise #5: scap sets  Comment #5: x10 with 5\" holds  Exercise #6: R scalene and UT stretch w/o overpressure  Comment #6: 5-15\" holds, x3  Exercise #7: blue foam squeezes and finger pinches    Plan for next visit: how are new exs going? Still getting ear/face pain on R? How is numbness into R UE and hand? How's sleeping? Rock tool R UT, Check  in R hand    Subjective:     Pain Rating:     Yesterday was a rough day. Was in bed from about 1:30-10:30 PM. Had a migraine and through up yesterday. Left hip keeps popping out and it is very painful. He notices that when he's more active, the pain is worse. Numbness in R hand seems to be the same as last time.  strength seems to be better.       Objective:     Tender R UT, scalene, rhomboid, middle trap  Posture is slightly improved  No increase in pain after MT today  R : 41, 39, 51#    Avg  strength on R first day of therapy: 30.66  avg  strength on R today: 43.6      Treatment Today     TREATMENT MINUTES COMMENTS   Evaluation     Self-care/ Home management     Manual therapy 11 Rock tool to R UT, scalene, rhomboid, middle trap   Neuromuscular Re-education     Therapeutic Activity     Therapeutic Exercises 21 Discussed progress. Objective measures taken. Progressed HEP- see flow sheet for details.    Gait training   "   Modality__________________                Total 32    Blank areas are intentional and mean the treatment did not include these items.       Cathy Brown, PT, DPT  8/22/2018

## 2021-06-20 NOTE — PROGRESS NOTES
Assessment:   Liban Nava is a 57 y.o. y.o. male with past medical history significant for GERD, hypertension who presents today for follow-up regarding chronic and progressive right neck pain with radiation to the right upper extremity.  Patient is a history of a cervical fusion from C3 through C7 as well as cervical laminoplasty C3 through C5.  MRI cervical spine shows moderately severe bilateral foraminal stenosis at C7-T1.  Patient had improvement of his right arm pain after a right C7-T1 transforaminal epidural steroid injection on June 1, 2018.  Unfortunately, patient continued to have significant right upper neck pain and headaches.  He is status post a right C2-3 transforaminal epidural steroid injection on September 7, 2018 which has provided 80% relief of his headaches.  He is very pleased with his outcome.  He had previously failed a right C2, C3 medial branch block, right occipital nerve block, and a trigger point injection.       Plan:     A shared decision making plan was used.  The patient's values and choices were respected.  The following represents what was discussed and decided upon by the physician assistant and the patient.      1.  DIAGNOSTIC TESTS: I reviewed the MRI cervical spine.  No further diagnostic tests were ordered.    2.  PHYSICAL THERAPY: Patient just completed his seventh session of physical therapy.  The course ended because the patient is undergoing a revision of his total hip replacement on September 26, 2018.  I did encourage the patient to continue doing his home exercises.    3.  MEDICATIONS: No changes are made to the patient's medications.  He uses gabapentin 900 mg 3 times daily, tizanidine 6 mg at bedtime, tramadol 2 tabs twice daily, and Percocet as needed.  Celebrex is on hold in preparation for hip surgery.  He typically takes 100 mg twice daily.      4.  INTERVENTIONS: No additional interventions were ordered.  -If the headaches and pressure sensation in the upper  neck if the right upper extremity were to return, recommend repeating the right C2-3 transforaminal epidural steroid injection.  -Pain in the C8 distribution worsens, we could repeat the right C7-T1 transforaminal epidural steroid injection.    5.  PATIENT EDUCATION: Patient is in agreement with the above plan.  All questions were answered.    6.  FOLLOW-UP: Patient to follow-up as needed.  If he has any questions or concerns, he should not hesitate to call.    Subjective:     Liban Nava is a 57 y.o. male who presents today for follow-up regarding chronic right neck pain and right upper extremity pain with associated headaches.  Patient status post a right C2-3 transforaminal epidural steroid injection on September 7, 2018.  Patient reports 80% improvement in his headaches since the injection.  He also reports resolution of the pressure discomfort in the upper neck.  Patient is very pleased with this outcome.    Patient states that he continues to have some right greater than left neck pain when he turns his head.  He also continues to have pain radiating to the right shoulder, down the right triceps, into the ulnar forearm, and into fingers 4 and 5 with associated numbness and tingling.  He does still feel that he is getting some relief from the right C7-T1 transforaminal epidural steroid injection that was performed on June 1, 2018.  Patient has chronic weakness in the right arm which is unchanged.  He rates his pain today as a 4 out of 10.  At its best it is a 2 out of 10.  At its worst it is an 8 out of 10.  Pain is aggravated with turning and bending his neck.  It is alleviated with applying ice.  He is also found that some of the stretches and exercises that were taught by physical therapy have been very helpful for his pain.    Patient just completed 7 sessions of physical therapy.  The course is over because the patient is scheduled for a revision of his left total hip arthroplasty on September 26,  2018.  Patient is using gabapentin 900 mg 3 times daily, tizanidine 6 mg at bedtime, tramadol 100 mg twice daily, and Percocet as needed.  Celebrex is currently on hold in preparation for his hip surgery.    Past medical history is reviewed and is unchanged in the interim.    Family history is reviewed and is unchanged in the interim.    Review of Systems:  Positive for numbness/tingling, weakness, headache, balance changes.  Negative for loss of bowel/bladder control, footdrop, dizziness, nausea/vomiting, blurry vision.     Objective:   CONSTITUTIONAL:  Vital signs as above.  No acute distress.  The patient is well nourished and well groomed.    PSYCHIATRIC:  The patient is awake, alert, oriented to person, place and time.  The patient is answering questions appropriately with clear speech.  Normal affect.  HEENT: Normocephalic, atraumatic.  Sclera clear.  Neck is supple.  SKIN:  Skin over the face, neck bilateral upper extremities is clean, dry, intact without rashes.  MUSCULOSKELETAL: The patient has strength which is 4-/5 right and 5/5 left shoulder abductors, 4/5 right and 5/5 left elbow flexors/extensors, 4/5 strength and 5/5 left finger flexors, 4-/5 right and 5/5 left finger abductor's, 4/5 right and 5/5 left wrist extensors.  Cervical range of motion is restricted in all directions due to pain.  NEUROLOGICAL: 1-2+ biceps, brachioradialis, triceps reflexes bilaterally.  Negative Cross's bilaterally.  Sensation to light touch is slightly diminished right C8 dermatome.    RESULTS:    [facility]  CT CERVICAL SPINE WO CONTRAST  7/23/2018 11:47 AM      INDICATION: Neck pain, prior surgery (neck), x-ray negative.  TECHNIQUE: Helical thin-section CT scan of the spine was performed using bone reconstruction algorithm. From the axial source data, sagittal and coronal thin reformats. Dose reduction techniques were used.   IV CONTRAST: None.  COMPARISON: Plain film radiograph of today and MRI cervical  spine from 10/06/2017.      FINDINGS: Straightening of the normal cervical lordosis. 2 mm anterolisthesis C2 on C3 not obviously changed compared to the prior MRI and related to severe right-sided facet arthropathy. Alignment is otherwise normal. No acute fracture. Postsurgical   changes ACDF from C4 through C5 with intact plate and screw hardware and solid fusion. Solid anterior fusion is also noted at C3-C4 through C6-C7. Laminoplasty changes C3 through C5 with intact hardware. Ankylosis of the facets at C3-C4 and C5-C6. No   high-grade canal narrowing. Moderate right C2-C3, marked left C3-C4 and right C4-C5 foraminal narrowing. Moderate to marked right C5-C6 and left C6-C7 foraminal narrowing. The visualized lung apices are clear. Moderate to marked degenerative disc disease   C2-C3. Marked loss of disc space height C7-T1 and T1-T2. The visualized soft tissues of the neck are grossly normal.      IMPRESSION:   CONCLUSION:  1.  No acute fracture or high-grade canal narrowing.      2.  Slight anterolisthesis C2 on C3 related to marked right-sided facet arthropathy.      3.  Anterior fusion C3-C4 through C6-C7 is solid with intact hardware.      4.  Marked loss of disc height at C7-T1 and T1-T2.      5.  Moderate to marked degenerative disc disease at C2-C3.      6.  Laminoplasty C3 through C5.      7.  Moderate to marked multilevel foraminal narrowing.

## 2021-06-20 NOTE — PROGRESS NOTES
Assessment:   Liban Nava is a 57 y.o. y.o. male with past medical history significant for GERD, hypertension who presents today for follow-up regarding chronic and progressive right neck pain with radiation to the right upper extremity.  Patient is a history of cervical fusion from C3 through C7 as well as cervical laminoplasty C3 through C5.  MRI cervical spine shows moderately severe bilateral foraminal stenosis at C7-T1.  Patient has had improvement of right arm pain after a right C7-T1 transforaminal epidural steroid injection on June 1, 2018, but the relief only lasted about 1 month.  Patient has continued to have significant right upper axial neck pain.  Patient failed a right C2, C3 medial branch block, a right occipital nerve block, and a trigger point injection.         Plan:     A shared decision making plan was used.  The patient's values and choices were respected.  The following represents what was discussed and decided upon by the physician assistant and the patient.      1.  DIAGNOSTIC TESTS: I reviewed the MRI cervical spine.  No further diagnostic tests were ordered.    2.  PHYSICAL THERAPY: Patient is currently in physical therapy.  Encouraged him to continue doing his home exercises.    3.  MEDICATIONS: No changes are made to the patient's medications.  He uses gabapentin 900 mg 3 times daily, Celebrex 100 mg twice daily, tizanidine 60 mg at bedtime, Percocet daily, and tramadol 2 tabs twice daily.    4.  INTERVENTIONS: Patient is interested in having another injection.  Patient is scheduled to have hip surgery on September 26, 2018 with Dr. Daron Barker at [facility].  We will first need to contact his staff to find out if the patient could have an injection this close to his hip surgery.  If the patient can have an injection, I think we have 2 options.  First option will be repeating the right C7-T1 transforaminal epidural steroid injection.  We could potentially also try a  right C2-3 transforaminal epidural steroid injection.  I will need to discuss this case with Dr. Ta.  I am not sure if a C2-3 transforaminal epidural steroid injection is technically possible.  If the patient can have an injection, he would prefer to have relief of his upper neck pain (the right C2-3 transforaminal epidural steroid injection, if possible) first since that is the most severe area of his pain.  -Patient was seen by Dr. Gray on August 8, 2018 who recommended considering radiofrequency ablation on the right at C2.  Unfortunately, patient would not be a candidate for this since he failed his right C2, C3 medial branch block.    5.  PATIENT EDUCATION: Patient is in agreement with the above plan.  All questions were answered.    6.  FOLLOW-UP: A nurse will call the patient after we have heard back from Dr. Barker and Dr. Ta.  He has any questions or concerns in the meantime, he should not hesitate to call.    Subjective:     Liban Nava is a 57 y.o. male who presents today for follow-up regarding right neck pain and right upper extremity pain and paresthesias.  I last saw the patient on July 13, 2018.  At that time refer the patient to see Dr. Gray.  Patient saw Dr. Gray on July 20 and again on August 8.  Dr. Gray has recommended that he try additional conservative treatment.  He returns today to discuss his options.    Patient continues to complain of right neck pain.  Most severe area of pain is in the right upper neck.  It extends toward the ear and into the back of the head causing headache.  Pain also radiates into the shoulder, down the arm, into the ulnar forearm, and into digits 4 and 5 with numbness and tingling.  Patient feels that the relief he had after his right C7-T1 transforaminal epidural steroid injection on Jaleesa 1 from 2018 has worn off.  He rates his pain today as a 6 out of 10.  At its best is a 4-10.  At its worst is a 10 out of 10.  Pain is aggravated with  bending and turning his head.  It is alleviated with ice and laying down.  He feels weak in the right arm.    Patient is currently in physical therapy.  Uses gabapentin 900 mg 3 times daily, Celebrex 100 mg twice daily, tizanidine 60 mg at bedtime, Percocet 1 tab daily, and tramadol 2 tabs twice daily.    Past medical history is reviewed and is pertinent for the consultation with Dr. Gray in physical therapy.    Family history is reviewed and is unchanged in the interim.    Review of Systems:  Positive for numbness/tingling, weakness, headache, dizziness, nausea/vomiting, blurry vision, balance changes.  Negative loss of bowel/bladder control, footdrop.     Objective:   CONSTITUTIONAL:  Vital signs as above.  No acute distress.  The patient is well nourished and well groomed.    PSYCHIATRIC:  The patient is awake, alert, oriented to person, place and time.  The patient is answering questions appropriately with clear speech.  Normal affect.  HEENT: Normocephalic, atraumatic.  Sclera clear.  Neck is supple.  SKIN:  Skin over the face, neck bilateral upper extremities is clean, dry, intact without rashes.  MUSCULOSKELETAL: The patient has strength which is 4-/5 right and 5/5 left shoulder abductors, 4/5 right and 5/5 left elbow flexors/extensors, 4/5 strength and 5/5 left finger flexors, 4-/5 right and 5/5 left finger abductor's, 4/5 right and 5/5 left wrist extensors.  NEUROLOGICAL:   Sensation to light touch is subjectively diminished/altered right fingers 4 and 5.    RESULTS:    I reviewed an MRI cervical spine from [facility] dated October 6, 2017.  This shows postoperative changes of an interbody fusion from C3 through C7 which is solid.  There is an anterior plate and screw device at C4-5.  At C2-3 there is severe right facet arthritis and mild foraminal stenosis.  At C7-T1 there is severe bilateral facet arthropathy with 3 mm of degenerative anterolisthesis, moderately severe disc degeneration, and  moderate to severe bilateral foraminal stenosis.  Please see report for further details.    [facility]  CT CERVICAL SPINE WO CONTRAST  7/23/2018 11:47 AM     INDICATION: Neck pain, prior surgery (neck), x-ray negative.  TECHNIQUE: Helical thin-section CT scan of the spine was performed using bone reconstruction algorithm. From the axial source data, sagittal and coronal thin reformats. Dose reduction techniques were used.   IV CONTRAST: None.  COMPARISON: Plain film radiograph of today and MRI cervical spine from 10/06/2017.     FINDINGS: Straightening of the normal cervical lordosis. 2 mm anterolisthesis C2 on C3 not obviously changed compared to the prior MRI and related to severe right-sided facet arthropathy. Alignment is otherwise normal. No acute fracture. Postsurgical   changes ACDF from C4 through C5 with intact plate and screw hardware and solid fusion. Solid anterior fusion is also noted at C3-C4 through C6-C7. Laminoplasty changes C3 through C5 with intact hardware. Ankylosis of the facets at C3-C4 and C5-C6. No   high-grade canal narrowing. Moderate right C2-C3, marked left C3-C4 and right C4-C5 foraminal narrowing. Moderate to marked right C5-C6 and left C6-C7 foraminal narrowing. The visualized lung apices are clear. Moderate to marked degenerative disc disease   C2-C3. Marked loss of disc space height C7-T1 and T1-T2. The visualized soft tissues of the neck are grossly normal.     IMPRESSION:   CONCLUSION:  1.  No acute fracture or high-grade canal narrowing.     2.  Slight anterolisthesis C2 on C3 related to marked right-sided facet arthropathy.     3.  Anterior fusion C3-C4 through C6-C7 is solid with intact hardware.     4.  Marked loss of disc height at C7-T1 and T1-T2.     5.  Moderate to marked degenerative disc disease at C2-C3.     6.  Laminoplasty C3 through C5.     7.  Moderate to marked multilevel foraminal narrowing.

## 2021-06-20 NOTE — PROGRESS NOTES
[facility] Daily Progress     Patient Name: Liban Nava  Date: 9/12/2018  Visit #: 7  PTA visit #:  -  Referral Diagnosis: neural foraminal stenosis  Referring provider: Isamar Gray  Visit Diagnosis:     ICD-10-CM    1. Right cervical radiculopathy M54.12    2. Generalized muscle weakness M62.81    3. Poor posture R29.3    4. Status post lumbar spine operation Z98.890    5. Difficulty walking R26.2        Past Medical History:   Diagnosis Date     Anemia      Back pain with radiation     to legs     BPH (benign prostatic hyperplasia)      Cervical spondylosis      Degenerative disc disease, lumbar      Degenerative joint disease      DVT (deep venous thrombosis) (H)      GERD (gastroesophageal reflux disease)      History of anesthesia complications     C3-6 fusion     Hypertension     not currently on meds     Lumbar spondylosis      Neck pain      Vitamin D deficiency          Assessment:   Patient feels 50% better overall since beginning PT. His injection significantly helped his headaches and neck pain. His exercises seem to be helping the tingling and pain into his R UE. His neural tension testing has significantly improved since beginning therapy, although his nerves are still slightly flared up. Patient's  strength is double the strength he had during his initial evaluation. Will look into D/C from PT next session before he has a hip revision.    HEP/POC compliance is  good .  Patient demonstrates understanding/independence with home program.  Patient is benefitting from skilled physical therapy and is making steady progress toward functional goals.  Patient is appropriate to continue with skilled physical therapy intervention, as indicated by initial plan of care.    Goal Status (progressing towards):  Pt. will demonstrate/verbalize independence in self-management of condition in : 6 weeks  Pt. will be independent with home exercise program in : 6 weeks  Pt. will have improved  "quality of sleep: with less pain;waking less times/night;in 6 weeks  Patient will decrease : NDI score;by _ points;in 6 weeks  Pt will: have decreased numbness and tingling into R UE by at least 50% within 8 weeks in order to improve his QOL and function.  Pt will: have improved strength by 10# in order to improve  strength for meal prep within 8 weeks.    Plan / Patient Education:     Continue with initial plan of care.  Progress with home program as tolerated.    Exercises:  Exercise #1: radial nerve tensioners w/ head involvement  Comment #1: x20 on R  Exercise #2: ulnar nerve glides on ear  Comment #2: x20 B  Exercise #3: nerve tensioners at 145 degrees abd w/o head involvement and hands together   Comment #3: x20 B  Exercise #4: supine pec stretch  Comment #4: 30\" holds  Exercise #5: scap sets  Comment #5: x10 with 5\" holds  Exercise #6: R scalene and UT stretch w/o overpressure  Comment #6: 5-15\" holds, x3  Exercise #7: blue foam squeezes and finger pinches  Exercise #8: cervical isometric  Comment #8: x10 with 5\" holds- all 4 directions    Plan for next visit: prepare for D/C- how are new exs going?  Check  in R hand. Go over goals. % progress, Rock tool R UT,    Subjective:     Pain Rating: Has a headache right now    Was hospitalized last week due to chest pain and high blood pressure. Had injection last week and had almost immediate relief of headache and neck pain. Just a little bit of tingling into R UE now. Feeling overall significantly better. Feels 50% better overall.      Objective:     Significantly less adhesions into myofascial area in neck  Posture is slightly improved  No increase in pain after MT today  R : 65, 60, 65#   +median, ulnar, and radial nerve tension testing but significantly improved    Avg  strength on R first day of therapy: 30.66      Treatment Today     TREATMENT MINUTES COMMENTS   Evaluation     Self-care/ Home management     Manual therapy 10 Rock tool to R UT, " scalene, rhomboid, middle trap   Neuromuscular Re-education     Therapeutic Activity     Therapeutic Exercises 20 Discussed progress. Objective measures taken. Reviewed and progressed her HEP.   Gait training     Modality__________________                Total 30    Blank areas are intentional and mean the treatment did not include these items.       Cathy Brown, PT, DPT  9/12/2018

## 2023-05-23 NOTE — H&P (VIEW-ONLY)
[facility]      Preoperative Consultation   Liban Nava   : 1960   Gender: male    Date of Encounter: 2023    Nursing Notes:   Veronica Montemayor  2023  1:44 PM  Signed  Chief Complaint   Patient presents with     Preoperative Exam     DOS: 23, [facility], surgery on left wrist/fusion     Liban Nava is a 62 y.o.male who presents for preop evaluation undergoing surgery on left wrist/fusion    Date of Surgery: 23  Surgeon: Dr. Jj  [facility]: [facility]  Fax Number: [phone]  Tetanus up to date: no  Latex Allergy: no  Use of aspirin, blood thinners, NSAID or steroids: yes  Anesthesia Complications: None  History of abnormal bleeding : None  History of Blood Transfusions: No  Health Care Directive or Living Will: yes  Primary Physician: Dr. Rachel Montemayor LPN 2023 1:37 PM             History of Present Illness   Left sided wrist pain since injury last august will now have fusion    He is otherwise doing well  Remarkably he has self weaned off narcotics, He feels much better off medication    Chronic neck and back pain     Review of Systems   A comprehensive review of systems was negative except for items noted in HPI.    Patient Active Problem List   Diagnosis Code     Radiating back pain M54.9     Cervical neck pain with evidence of disc disease M50.90     HTN (hypertension) I10     DJD (degenerative joint disease) of hip M16.9     Lumbar spondylosis- L4-5 stenosis with le weakness and gait disturbance M47.816     Trochanteric bursitis M70.60     Pleuritis R09.1     Personal history of colonic polyps Z86.010     Cervical myelopathy (HC) G95.9     BPH (benign prostatic hyperplasia) N40.0     Gastroesophageal reflux disease K21.9     Lumbar spinal stenosis M48.061     Anemia, blood loss D50.0     Neuropathic pain M79.2     Spasm R25.2      Primary osteoarthritis of left hip M16.12     s/p left 2-incision total hip arthroplasty 8/31/16 Z47.89     Post-operative wound abscess, initial encounter [id number]     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     Renal calculi N20.0     Urinary retention R33.9     Infected prosthetic hip, sequela T84.59XS, Z96.649     Left hip pain M25.552     Other chest pain R07.89     GAITAN (dyspnea on exertion) R06.09     Controlled substance agreement signed Z79.899     Controlled substance agreement signed Z79.899     s/p left total hip arthroplasty revision DOS:2/06/19 by Daron Barker Z96.642      s/p I & D left hip with liner exchange DOS:11/26/17 by Alessio Peña MD Z98.890     s/p left hip revision DOS:11/08/17 by Dr Barker Z96.649     s/p I & D of left hip with antibiotic spacer placed DOS:12/06/17 by Dr Barker Z98.890      s/p left hip re implantation DOS: 9/26/18 by Dr Barker Z98.890     Depression, major, single episode, mild (HC) F32.0     Controlled substance agreement signed Z79.899     Current Outpatient Medications   Medication Sig     acetaminophen (TYLENOL EXTRA STRGTH) 500 mg tablet Take 2 tablets by mouth every 6 hours if needed. Max acetaminophen dose: 4000mg in 24 hrs.     ascorbic acid, vitamin C, (VITAMIN C) 1,000 mg tablet Take 1 tablet by mouth once daily. Indications: post op anemia     aspirin chewable 81 mg chewable tablet Hold while on rivaroxaban.     atorvastatin (LIPITOR) 20 mg tablet TAKE 1 TABLET BY MOUTH EVERYDAY AT BEDTIME     buprenorphine HCL (SUBUTEX) 2 mg subl Take 1/2 tablet under the tongue   twice a day   USE DATES:  4/5/2023-5/4/2023     calcium carbonate (CALCIUM 500) 500 mg calcium (1,250 mg) tablet Take 2 tablets by mouth once daily with a meal.     celecoxib (CELEBREX) 100 mg capsule Take 1 Capsule (100 mg) by mouth two times daily with meals.     Crutch For home use x 99 days     cyclobenzaprine (FLEXERIL) 10 mg tablet Take 1 Tablet (10 mg) by mouth 3 times  daily if needed for Muscle Spasm.     cyclobenzaprine (FLEXERIL) 10 mg tablet Take 1 tablet by mouth at bedtime if needed for Muscle Spasm.     DULoxetine (CYMBALTA) 20 mg Delayed-release capsule TAKE 1 CAPSULE BY MOUTH EVERY DAY     fish oil-omega-3 fatty acids (FISH OIL) 1,200-360 mg cap Hold while on rivaroxaban.     gabapentin (NEURONTIN) 300 mg capsule TAKE 3 CAPSULES BY MOUTH 3 TIMES DAILY.     glucosamine-chondroitin, 500-400 mg, (COSAMIN /400) 500-400 mg cap Take 2 capsules by mouth once daily.     ibuprofen (ADVIL; MOTRIN) 600 mg tablet Take 1 Tablet (600 mg) by mouth 3 times daily if needed for Pain. Maximum of 3200 mg in 24 hours.     LORazepam (ATIVAN) 0.5 mg tab Take 1 Tablet (0.5 mg) by mouth every 4 hours if needed for Anxiety.     metoprolol succinate (Toprol XL) 25 mg Sustained-Release tablet Take 1 Tablet (25 mg) by mouth once daily.     multivitamin-minerals therapeutic tablet Take 1 tablet by mouth once daily.     naloxone (Narcan) 4 mg/actuation nasal spray Inhale 1 Spray into affected nostril(s) each time if needed for Patient Diff To Arouse or Resp Rate < 8 / min. Additional doses may be given every 2 to 3 minutes until emergency medical assistance arrives.     omeprazole (PRILOSEC) 20 mg Delayed-Release capsule TAKE 1 CAPSULE BY MOUTH TWICE DAILY BEFORE A MEAL     Senna 8.6 mg tablet TAKE 1 TABLET (8.6 MG) BY MOUTH 2 TIMES DAILY.     tamsulosin (FLOMAX) 0.4 mg capsule TAKE 1 CAPSULE (0.4 MG) BY MOUTH ONCE DAILY AFTER A MEAL.     tiZANidine (ZANAFLEX) 4 mg tablet TAKE 3 TABLETS BY MOUTH AT BEDTIME     No current facility-administered medications for this visit.     Medications have been reviewed by me and are current to the best of my knowledge and ability.     Allergies   Allergen Reactions     Blood-Group Specific Substance *Unknown     Anti-E and anti-K.  Blood product orders may be delayed.  Draw one red top and 2 purple top tubes for all Type and Screen/Type and Crossmatch orders.        Cats (Fur, Dander, Saliva) Shortness Of Breath     Cefazolin Leukopenia     White blood count dropped to 1400 and .     Cephalexin Itching     Past Surgical History:   . Laterality Date     APPENDECTOMY       BACK SURGERY  6/2014    L4 L5 bilateral decompression     CERVICAL LAMINECTOMY  5/19/2014    laminoplasty C3-5, partial C6 laminectomy     CHOLECYSTECTOMY      Cholecystectomy     COLONOSCOPY SCREENING  11/28/11    polyps x2     CVL CORONARY ANGIOGRAM  2005    normal 2005     HERNIA REPAIR  01/2007    Right inguinal, umbilical   x2     hx nasal polyps removal       IRRIGATION DEBRIDEMENT LEFT HIP POSSIBLE EXPLANT WITH ANTIBIOTIC SPACER PLACEMENT  12/06/2017     l4-5 hemilaminectomy and L5-S1 right foraminectomy  03/2017     LEFT EXTRACORPOREAL SHOCK WAVE LITHOTRIPSY   08/13/2018     LEFT HIP RE IMPLANTATION  Left 09/26/2018     LEFT HIP RESECTION ARTHROPLASTY WITH ANTIBIOTIC SPACER PLACEMENT  Left 11/28/2018     LEFT INGUINAL HERNIA REPAIR WITH MESH  1/14/2015     LEFT TOTAL HIP ARTHROPLASTY  8/31/2016     leg surgery bilat as teenager       LITHOTRIPSY Left 08/13/2018     RI UNLISTED PROCEDURE NECK/THORAX  06/2000    Cervical Fusion  C5-6 & 6-7     REIMPLANTATION OF LEFT TOTAL HIP ARTHROPLASTY  02/06/2019     RIGHT ESWL  04/20/2018     Spacer Pacement  11/28/2018    Left Hip - antibiotic     VASECTOMY       vvIRRIGATION AND DEBRIDEMENT HEAD AND LINER EXCHANGE LEFT HIP   11/26/2017     Social History     Tobacco Use     Smoking status: Never     Smokeless tobacco: Never   Vaping Use     Vaping Use: Never used   Substance Use Topics     Alcohol use: No     Alcohol/week: 0.0 standard drinks of alcohol     Drug use: No     Family History   Problem Relation Age of Onset     Heart Disease Father      Hypertension Father      Stroke Father 68     Diabetes Mother      Other Mother         parkinsons, dementia     Heart Disease Brother         MI 55       PAST DIFFICULTY WITH ANESTHESIA: Yes, personal hx  "  Hard to intubate     Physical Exam   /82 (Cuff Site: Right Arm, Position: Sitting, Cuff Size: Adult Large)   Pulse (!) 102   Temp 99.3  F (37.4  C) (Tympanic)   Resp 17   Ht 1.727 m (5' 8\")   Wt 88.1 kg (194 lb 3.2 oz)   SpO2 99%   BMI 29.53 kg/m   Body mass index is 29.53 kg/m .  General Appearance: Pleasant, alert, appropriate appearance for age. No acute distress  Head Exam: Normocephalic, without obvious abnormality.  Eye Exam: Normal external eye, conjunctiva, lids, cornea. BABS.  Funduscopic Exam: Normal red reflex and fundoscopic exam.  Ear Exam: Normal TM's bilaterally. Normal auditory canals and external ears. Non-tender.  Nose Exam: Normal external nose, mucus membranes, and septum.  OroPharynx Exam: Dental hygiene adequate. Normal buccal mucosa. Normal pharynx.  Neck Exam: Supple, no masses or nodes.  Thyroid Exam: No nodules or enlargement.  Chest/Respiratory Exam: Normal chest wall and respirations. Clear to auscultation.  Cardiovascular Exam: Regular rate and rhythm. S1, S2, no murmur, click, gallop, or rubs.  Gastrointestinal Exam: Soft, nontender, no abnormal masses or organomegaly.  Lymphatic Exam: Non-palpable nodes in neck, clavicular, axillary, or inguinal regions.  Musculoskeletal Exam: Back is straight and non-tender, full ROM of upper and lower extremities.  Neurologic Exam: Nonfocal, symmetric DTRs, normal gross motor, tone coordination and no tremor.  Psychiatric Exam: Alert and oriented, appropriate affect.       Assessment / Plan     Labs: no  ECG: no    ICD-10-CM    1. Preop examination  Z01.818       2. HTN (hypertension)  I10       3. Left wrist pain  M25.532         Patient is cleared for planned procedure.   Electronically Signed by:   Roman Ramos MD    5/23/2023   3:03 PM    5/23/2023      The Pre-Op Tool    Recommendations      Low Risk Procedure    Cardiac History  No history of coronary artery disease           Labs  No routine labs indicated  EKG  Not " indicated  Stress Testing  Not indicated    * Testing recommendations are intended to assist, but not direct, clinical decisions.           Continue taking Celecoxib (Celebrex) through procedure (it does not 'thin' the blood).    * Medication recommendations are not intended to be exhaustive; they are limited to common medications that are potentially dangerous if incorrectly managed          Labs  * Data supports elimination of  routine  laboratory testing in favor of focused,  indicated  testing based on medical co-morbidities. A 2009 study randomized 1061 patients undergoing ambulatory, non-cataract surgery to routine or to indicated testing. Perioperative adverse events were similar (Anesthesia & Analgesia 2009;108:467-75; Anesthesiol. Clin. 2016 Mar;34(1):43-58).  EKG  * The ACC/AHA recommends against obtaining routine EKGs in patients undergoing low risk surgeries, a class IIa recommendation (JACC. 2014;64(21);e1-76).     Session ID: [id number]  Endnotes and bibliography available upon request: [email or web address]

## 2023-06-02 ENCOUNTER — MEDICAL CORRESPONDENCE (OUTPATIENT)
Dept: HEALTH INFORMATION MANAGEMENT | Facility: CLINIC | Age: 63
End: 2023-06-02
Payer: MEDICARE

## 2023-06-07 ENCOUNTER — ANESTHESIA EVENT (OUTPATIENT)
Dept: SURGERY | Facility: CLINIC | Age: 63
End: 2023-06-07
Payer: MEDICARE

## 2023-06-07 NOTE — ANESTHESIA PREPROCEDURE EVALUATION
"Anesthesia Pre-Procedure Evaluation    Patient: Liban Nava   MRN: [id number] : 1960        Procedure : Procedure(s):  Left Wrist Scaphold Excision, Four Corner Fusion          Past Medical History:   Diagnosis Date     Anemia      BPH (benign prostatic hyperplasia)      Cervical myelopathy (H)      Cervical neck pain with evidence of disc disease      Controlled substance agreement signed      Degenerative joint disease (DJD) of hip      GAITAN (dyspnea on exertion)      Gastroesophageal reflux disease      History of colonic polyps      Hypertension      Kidney stone      Left hip prosthetic joint infection (H)      Lumbar spinal stenosis      Lumbosacral spondylosis     L4-L5 with leg weakness and gait distrubance     Major depression      Neuropathic pain      Other chest pain      Other chronic pain     Neck, Back and Left Wrist     Pleuritis      Post-operative wound abscess      Spasm      Trochanteric bursitis      Urinary retention       Past Surgical History:   Procedure Laterality Date     APPENDECTOMY       BACK SURGERY       CARDIAC SURGERY      ANGIOGRAM     CERVICAL FUSION  , ,      CHOLECYSTECTOMY       COLONOSCOPY       GENITOURINARY SURGERY      ESWL, VASECTOMY     HERNIA REPAIR Right      JOINT REPLACEMENT Left     TRINA     KIDNEY STONE SURGERY       LAMINOPLASTY Bilateral 2014    C3-5 laminoplasty with C6 partial laminectomy on 2014 by Dr. Sabrina Gutierrez for cervical myeloplathy     LEG SURGERY Bilateral     to correct \"bowed legs\"     LUMBAR LAMINECTOMY Bilateral 2014    L3 L4 laminectomy for stenosis, bilateral L34 medial facetectomy for lateral recess decompression, and bilateral L34 foraminotomy on 2014 by Dr. Sabrina Gutierrez.     LUMBAR LAMINECTOMY  2017    Procedure: RIGHT L5 ROOT DECOMPRESSION;  Surgeon: Sabrina Gutierrez MD;  Location: [facility];  Service:      NOSE SURGERY      polypectomy     OTHER SURGICAL HISTORY      C3-6 " laminoplasty     REVISION, ARTHROPLASTY, HIP, TOTAL, ANTERIOR APPROACH, USING HANA TABLE Left      SINUS SURGERY        Allergies   Allergen Reactions     Cat Dander [Animal Dander] Shortness Of Breath     Blood-Group Specific Substance      Anti-E and Anti-K Blood Product orders may be delayed. Draw one red top and 2 purple top tubes for all Type & Screen/Type & Crossmatch orders.     Cefazolin      Leukopenia- WBC's dropped to 1400 and .     Cephalexin Itching      Social History     Tobacco Use     Smoking status: Never     Smokeless tobacco: Never   Vaping Use     Vaping status: Not on file   Substance Use Topics     Alcohol use: No      Wt Readings from Last 1 Encounters:   08/08/18 83.5 kg (184 lb)        Anesthesia Evaluation   Pt has had prior anesthetic.     No history of anesthetic complications       ROS/MED HX  ENT/Pulmonary: Comment: PFTs 2021  IMPRESSION:   Normal complete pulmonary function test and flow volume loop.  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    (+) peripheral neuropathy,     Cardiovascular: Comment: 2/20 stress test   FINAL CONCLUSIONS    1.  Normal pharmacologic stress perfusion imaging study.    2.  LV systolic function was normal without regional wall motion abnormalities.    3.  LVEF is calculated to be 61%.       (+) hypertension-----    METS/Exercise Tolerance:     Hematologic:     (+) anemia,     Musculoskeletal: Comment: s/p left 2-incision total hip arthroplasty 8/31/16  -Post-operative wound abscess, initial encounter  -Left hip prosthetic joint infection (HC)       (+) arthritis,     GI/Hepatic:     (+) GERD,     Renal/Genitourinary: Comment: Urinary  retention    (+) Nephrolithiasis , BPH,     Endo:  - neg endo ROS     Psychiatric/Substance Use: Comment: Patient has weaned himself off oral opioids.    (+) H/O chronic opiod use .     Infectious Disease:  - neg infectious disease ROS     Malignancy:  - neg malignancy ROS     Other: Comment: Controlled substance agreement signed     (+) , H/O Chronic Pain,        Physical Exam    Airway        Mallampati: II   TM distance: > 3 FB   Neck ROM: limited   Mouth opening: > 3 cm    Respiratory Devices and Support         Dental       (+) Minor Abnormalities - some fillings, tiny chips      Cardiovascular   cardiovascular exam normal       Rhythm and rate: regular and normal     Pulmonary   pulmonary exam normal        breath sounds clear to auscultation           OUTSIDE LABS:  CBC: No results found for: WBC, HGB, HCT, PLT  BMP: No results found for: NA, POTASSIUM, CHLORIDE, CO2, BUN, CR, GLC  COAGS: No results found for: PTT, INR, FIBR  POC: No results found for: BGM, HCG, HCGS  HEPATIC: No results found for: ALBUMIN, PROTTOTAL, ALT, AST, GGT, ALKPHOS, BILITOTAL, BILIDIRECT, ZAKI  OTHER: No results found for: PH, LACT, A1C, DANELLE, PHOS, MAG, LIPASE, AMYLASE, TSH, T4, T3, CRP, SED    Anesthesia Plan    ASA Status:  3   NPO Status:  NPO Appropriate    Anesthesia Type: General.     - Airway: ETT   Induction: Intravenous, Propofol.   Maintenance: Balanced.   Techniques and Equipment:     - Airway: Video-Laryngoscope         Consents    Anesthesia Plan(s) and associated risks, benefits, and realistic alternatives discussed. Questions answered and patient/representative(s) expressed understanding.    - Discussed:     - Discussed with:  Patient         Postoperative Care    Pain management: IV analgesics, Multi-modal analgesia.   PONV prophylaxis: Ondansetron (or other 5HT-3), Dexamethasone or Solumedrol     Comments:    Other Comments: Due to median neuropathy. Will defer axillary block. GETA is the plan.            Ruperto  MD [place]

## 2023-06-08 ENCOUNTER — ANESTHESIA (OUTPATIENT)
Dept: SURGERY | Facility: CLINIC | Age: 63
End: 2023-06-08
Payer: MEDICARE

## 2023-06-08 ENCOUNTER — HOSPITAL ENCOUNTER (OUTPATIENT)
Facility: CLINIC | Age: 63
Discharge: HOME | End: 2023-06-08
Attending: ORTHOPAEDIC SURGERY | Admitting: ORTHOPAEDIC SURGERY
Payer: MEDICARE

## 2023-06-08 ENCOUNTER — APPOINTMENT (OUTPATIENT)
Dept: GENERAL RADIOLOGY | Facility: CLINIC | Age: 63
End: 2023-06-08
Attending: ORTHOPAEDIC SURGERY
Payer: MEDICARE

## 2023-06-08 VITALS
SYSTOLIC BLOOD PRESSURE: 104 MMHG | BODY MASS INDEX: 29.45 KG/M2 | HEART RATE: 101 BPM | TEMPERATURE: 98.6 F | OXYGEN SATURATION: 97 % | WEIGHT: 194.3 LBS | DIASTOLIC BLOOD PRESSURE: 75 MMHG | HEIGHT: 68 IN | RESPIRATION RATE: 14 BRPM

## 2023-06-08 DIAGNOSIS — M19.039 WRIST ARTHRITIS: Primary | ICD-10-CM

## 2023-06-08 PROCEDURE — 258N000003 HC RX IP 258 OP 636: Performed by: REGISTERED NURSE

## 2023-06-08 PROCEDURE — 250N000011 HC RX IP 250 OP 636: Performed by: REGISTERED NURSE

## 2023-06-08 PROCEDURE — 250N000009 HC RX 250: Performed by: REGISTERED NURSE

## 2023-06-08 PROCEDURE — 999N000141 HC STATISTIC PRE-PROCEDURE NURSING ASSESSMENT: Performed by: ORTHOPAEDIC SURGERY

## 2023-06-08 PROCEDURE — 250N000011 HC RX IP 250 OP 636: Performed by: ANESTHESIOLOGY

## 2023-06-08 PROCEDURE — 250N000009 HC RX 250: Performed by: ANESTHESIOLOGY

## 2023-06-08 PROCEDURE — 250N000025 HC SEVOFLURANE, PER MIN: Performed by: ORTHOPAEDIC SURGERY

## 2023-06-08 PROCEDURE — 710N000009 HC RECOVERY PHASE 1, LEVEL 1, PER MIN: Performed by: ORTHOPAEDIC SURGERY

## 2023-06-08 PROCEDURE — C1762 CONN TISS, HUMAN(INC FASCIA): HCPCS | Performed by: ORTHOPAEDIC SURGERY

## 2023-06-08 PROCEDURE — 258N000003 HC RX IP 258 OP 636: Performed by: ANESTHESIOLOGY

## 2023-06-08 PROCEDURE — 360N000076 HC SURGERY LEVEL 3, PER MIN: Performed by: ORTHOPAEDIC SURGERY

## 2023-06-08 PROCEDURE — 999N000179 XR SURGERY CARM FLUORO LESS THAN 5 MIN W STILLS

## 2023-06-08 PROCEDURE — C1713 ANCHOR/SCREW BN/BN,TIS/BN: HCPCS | Performed by: ORTHOPAEDIC SURGERY

## 2023-06-08 PROCEDURE — 710N000012 HC RECOVERY PHASE 2, PER MINUTE: Performed by: ORTHOPAEDIC SURGERY

## 2023-06-08 PROCEDURE — 370N000017 HC ANESTHESIA TECHNICAL FEE, PER MIN: Performed by: ORTHOPAEDIC SURGERY

## 2023-06-08 PROCEDURE — 250N000009 HC RX 250: Performed by: ORTHOPAEDIC SURGERY

## 2023-06-08 PROCEDURE — 250N000011 HC RX IP 250 OP 636: Performed by: ORTHOPAEDIC SURGERY

## 2023-06-08 PROCEDURE — 272N000001 HC OR GENERAL SUPPLY STERILE: Performed by: ORTHOPAEDIC SURGERY

## 2023-06-08 PROCEDURE — 250N000013 HC RX MED GY IP 250 OP 250 PS 637: Performed by: ANESTHESIOLOGY

## 2023-06-08 DEVICE — IMPLANTABLE DEVICE: Type: IMPLANTABLE DEVICE | Site: WRIST | Status: FUNCTIONAL

## 2023-06-08 DEVICE — GRAFT BONE CHIPS CANC CUBE 15CC 100315: Type: IMPLANTABLE DEVICE | Site: WRIST | Status: FUNCTIONAL

## 2023-06-08 RX ORDER — SODIUM CHLORIDE, SODIUM LACTATE, POTASSIUM CHLORIDE, CALCIUM CHLORIDE 600; 310; 30; 20 MG/100ML; MG/100ML; MG/100ML; MG/100ML
INJECTION, SOLUTION INTRAVENOUS CONTINUOUS
Status: DISCONTINUED | OUTPATIENT
Start: 2023-06-08 | End: 2023-06-08 | Stop reason: HOSPADM

## 2023-06-08 RX ORDER — BUPIVACAINE HYDROCHLORIDE 5 MG/ML
INJECTION, SOLUTION EPIDURAL; INTRACAUDAL PRN
Status: DISCONTINUED | OUTPATIENT
Start: 2023-06-08 | End: 2023-06-08 | Stop reason: HOSPADM

## 2023-06-08 RX ORDER — ONDANSETRON 4 MG/1
4 TABLET, ORALLY DISINTEGRATING ORAL EVERY 30 MIN PRN
Status: DISCONTINUED | OUTPATIENT
Start: 2023-06-08 | End: 2023-06-08 | Stop reason: HOSPADM

## 2023-06-08 RX ORDER — DEXMEDETOMIDINE HYDROCHLORIDE 4 UG/ML
INJECTION, SOLUTION INTRAVENOUS PRN
Status: DISCONTINUED | OUTPATIENT
Start: 2023-06-08 | End: 2023-06-08

## 2023-06-08 RX ORDER — PROPOFOL 10 MG/ML
INJECTION, EMULSION INTRAVENOUS PRN
Status: DISCONTINUED | OUTPATIENT
Start: 2023-06-08 | End: 2023-06-08

## 2023-06-08 RX ORDER — LIDOCAINE HYDROCHLORIDE 20 MG/ML
INJECTION, SOLUTION INFILTRATION; PERINEURAL PRN
Status: DISCONTINUED | OUTPATIENT
Start: 2023-06-08 | End: 2023-06-08

## 2023-06-08 RX ORDER — ONDANSETRON 2 MG/ML
4 INJECTION INTRAMUSCULAR; INTRAVENOUS EVERY 30 MIN PRN
Status: DISCONTINUED | OUTPATIENT
Start: 2023-06-08 | End: 2023-06-08 | Stop reason: HOSPADM

## 2023-06-08 RX ORDER — CLINDAMYCIN PHOSPHATE 900 MG/50ML
900 INJECTION, SOLUTION INTRAVENOUS
Status: COMPLETED | OUTPATIENT
Start: 2023-06-08 | End: 2023-06-08

## 2023-06-08 RX ORDER — OXYCODONE HYDROCHLORIDE 5 MG/1
5-10 TABLET ORAL EVERY 4 HOURS PRN
Qty: 30 TABLET | Refills: 0 | Status: SHIPPED | OUTPATIENT
Start: 2023-06-08

## 2023-06-08 RX ORDER — FENTANYL CITRATE 0.05 MG/ML
50 INJECTION, SOLUTION INTRAMUSCULAR; INTRAVENOUS EVERY 5 MIN PRN
Status: DISCONTINUED | OUTPATIENT
Start: 2023-06-08 | End: 2023-06-08 | Stop reason: HOSPADM

## 2023-06-08 RX ORDER — HYDROXYZINE HYDROCHLORIDE 25 MG/1
25 TABLET, FILM COATED ORAL
Status: DISCONTINUED | OUTPATIENT
Start: 2023-06-08 | End: 2023-06-08 | Stop reason: HOSPADM

## 2023-06-08 RX ORDER — HYDROXYZINE PAMOATE 25 MG/1
25 CAPSULE ORAL 3 TIMES DAILY PRN
Qty: 25 CAPSULE | Refills: 0 | Status: SHIPPED | OUTPATIENT
Start: 2023-06-08

## 2023-06-08 RX ORDER — GLYCOPYRROLATE 0.2 MG/ML
INJECTION, SOLUTION INTRAMUSCULAR; INTRAVENOUS PRN
Status: DISCONTINUED | OUTPATIENT
Start: 2023-06-08 | End: 2023-06-08

## 2023-06-08 RX ORDER — HYDROMORPHONE HCL IN WATER/PF 6 MG/30 ML
0.2 PATIENT CONTROLLED ANALGESIA SYRINGE INTRAVENOUS EVERY 5 MIN PRN
Status: DISCONTINUED | OUTPATIENT
Start: 2023-06-08 | End: 2023-06-08 | Stop reason: HOSPADM

## 2023-06-08 RX ORDER — ACETAMINOPHEN 325 MG/1
975 TABLET ORAL ONCE
Status: COMPLETED | OUTPATIENT
Start: 2023-06-08 | End: 2023-06-08

## 2023-06-08 RX ORDER — ONDANSETRON 2 MG/ML
INJECTION INTRAMUSCULAR; INTRAVENOUS PRN
Status: DISCONTINUED | OUTPATIENT
Start: 2023-06-08 | End: 2023-06-08

## 2023-06-08 RX ORDER — HYDROMORPHONE HCL IN WATER/PF 6 MG/30 ML
0.4 PATIENT CONTROLLED ANALGESIA SYRINGE INTRAVENOUS EVERY 5 MIN PRN
Status: DISCONTINUED | OUTPATIENT
Start: 2023-06-08 | End: 2023-06-08 | Stop reason: HOSPADM

## 2023-06-08 RX ORDER — VASOPRESSIN IN 0.9 % NACL 2 UNIT/2ML
SYRINGE (ML) INTRAVENOUS PRN
Status: DISCONTINUED | OUTPATIENT
Start: 2023-06-08 | End: 2023-06-08

## 2023-06-08 RX ORDER — OXYCODONE HYDROCHLORIDE 5 MG/1
10 TABLET ORAL
Status: DISCONTINUED | OUTPATIENT
Start: 2023-06-08 | End: 2023-06-08 | Stop reason: HOSPADM

## 2023-06-08 RX ORDER — PROPOFOL 10 MG/ML
INJECTION, EMULSION INTRAVENOUS CONTINUOUS PRN
Status: DISCONTINUED | OUTPATIENT
Start: 2023-06-08 | End: 2023-06-08

## 2023-06-08 RX ORDER — OXYCODONE HYDROCHLORIDE 5 MG/1
5 TABLET ORAL
Status: COMPLETED | OUTPATIENT
Start: 2023-06-08 | End: 2023-06-08

## 2023-06-08 RX ORDER — DEXAMETHASONE SODIUM PHOSPHATE 4 MG/ML
INJECTION, SOLUTION INTRA-ARTICULAR; INTRALESIONAL; INTRAMUSCULAR; INTRAVENOUS; SOFT TISSUE PRN
Status: DISCONTINUED | OUTPATIENT
Start: 2023-06-08 | End: 2023-06-08

## 2023-06-08 RX ORDER — FENTANYL CITRATE 0.05 MG/ML
25 INJECTION, SOLUTION INTRAMUSCULAR; INTRAVENOUS EVERY 5 MIN PRN
Status: DISCONTINUED | OUTPATIENT
Start: 2023-06-08 | End: 2023-06-08 | Stop reason: HOSPADM

## 2023-06-08 RX ORDER — SODIUM CHLORIDE, SODIUM LACTATE, POTASSIUM CHLORIDE, CALCIUM CHLORIDE 600; 310; 30; 20 MG/100ML; MG/100ML; MG/100ML; MG/100ML
INJECTION, SOLUTION INTRAVENOUS CONTINUOUS PRN
Status: DISCONTINUED | OUTPATIENT
Start: 2023-06-08 | End: 2023-06-08

## 2023-06-08 RX ORDER — MAGNESIUM HYDROXIDE 1200 MG/15ML
LIQUID ORAL PRN
Status: DISCONTINUED | OUTPATIENT
Start: 2023-06-08 | End: 2023-06-08 | Stop reason: HOSPADM

## 2023-06-08 RX ADMIN — FENTANYL CITRATE 50 MCG: 50 INJECTION, SOLUTION INTRAMUSCULAR; INTRAVENOUS at 13:08

## 2023-06-08 RX ADMIN — FENTANYL CITRATE 25 MCG: 50 INJECTION, SOLUTION INTRAMUSCULAR; INTRAVENOUS at 13:22

## 2023-06-08 RX ADMIN — SODIUM CHLORIDE, POTASSIUM CHLORIDE, SODIUM LACTATE AND CALCIUM CHLORIDE: 600; 310; 30; 20 INJECTION, SOLUTION INTRAVENOUS at 10:58

## 2023-06-08 RX ADMIN — PHENYLEPHRINE HYDROCHLORIDE 200 MCG: 10 INJECTION INTRAVENOUS at 10:01

## 2023-06-08 RX ADMIN — PHENYLEPHRINE HYDROCHLORIDE 150 MCG: 10 INJECTION INTRAVENOUS at 10:37

## 2023-06-08 RX ADMIN — PHENYLEPHRINE HYDROCHLORIDE 150 MCG: 10 INJECTION INTRAVENOUS at 10:04

## 2023-06-08 RX ADMIN — CLINDAMYCIN PHOSPHATE 900 MG: 900 INJECTION, SOLUTION INTRAVENOUS at 08:56

## 2023-06-08 RX ADMIN — FENTANYL CITRATE 25 MCG: 50 INJECTION, SOLUTION INTRAMUSCULAR; INTRAVENOUS at 11:57

## 2023-06-08 RX ADMIN — Medication 0.5 UNITS: at 10:12

## 2023-06-08 RX ADMIN — SODIUM CHLORIDE, POTASSIUM CHLORIDE, SODIUM LACTATE AND CALCIUM CHLORIDE: 600; 310; 30; 20 INJECTION, SOLUTION INTRAVENOUS at 09:44

## 2023-06-08 RX ADMIN — PHENYLEPHRINE HYDROCHLORIDE 200 MCG: 10 INJECTION INTRAVENOUS at 10:16

## 2023-06-08 RX ADMIN — PHENYLEPHRINE HYDROCHLORIDE 150 MCG: 10 INJECTION INTRAVENOUS at 10:32

## 2023-06-08 RX ADMIN — GLYCOPYRROLATE 0.2 MG: 0.2 INJECTION, SOLUTION INTRAMUSCULAR; INTRAVENOUS at 09:58

## 2023-06-08 RX ADMIN — FENTANYL CITRATE 25 MCG: 50 INJECTION, SOLUTION INTRAMUSCULAR; INTRAVENOUS at 11:21

## 2023-06-08 RX ADMIN — OXYCODONE HYDROCHLORIDE 5 MG: 5 TABLET ORAL at 13:40

## 2023-06-08 RX ADMIN — DEXMEDETOMIDINE HYDROCHLORIDE 4 MCG: 200 INJECTION INTRAVENOUS at 12:28

## 2023-06-08 RX ADMIN — Medication 1 UNITS: at 10:13

## 2023-06-08 RX ADMIN — LIDOCAINE HYDROCHLORIDE 100 MG: 20 INJECTION, SOLUTION INFILTRATION; PERINEURAL at 09:52

## 2023-06-08 RX ADMIN — PHENYLEPHRINE HYDROCHLORIDE 150 MCG: 10 INJECTION INTRAVENOUS at 10:24

## 2023-06-08 RX ADMIN — FENTANYL CITRATE 50 MCG: 50 INJECTION, SOLUTION INTRAMUSCULAR; INTRAVENOUS at 09:52

## 2023-06-08 RX ADMIN — ACETAMINOPHEN 975 MG: 325 TABLET ORAL at 08:31

## 2023-06-08 RX ADMIN — GLYCOPYRROLATE 0.2 MG: 0.2 INJECTION, SOLUTION INTRAMUSCULAR; INTRAVENOUS at 10:11

## 2023-06-08 RX ADMIN — DEXMEDETOMIDINE HYDROCHLORIDE 4 MCG: 200 INJECTION INTRAVENOUS at 12:16

## 2023-06-08 RX ADMIN — SUCCINYLCHOLINE CHLORIDE 100 MG: 20 INJECTION, SOLUTION INTRAMUSCULAR; INTRAVENOUS; PARENTERAL at 09:53

## 2023-06-08 RX ADMIN — PHENYLEPHRINE HYDROCHLORIDE 0.6 MCG/KG/MIN: 10 INJECTION INTRAVENOUS at 10:55

## 2023-06-08 RX ADMIN — PHENYLEPHRINE HYDROCHLORIDE 200 MCG: 10 INJECTION INTRAVENOUS at 10:09

## 2023-06-08 RX ADMIN — DEXAMETHASONE SODIUM PHOSPHATE 4 MG: 4 INJECTION, SOLUTION INTRA-ARTICULAR; INTRALESIONAL; INTRAMUSCULAR; INTRAVENOUS; SOFT TISSUE at 10:00

## 2023-06-08 RX ADMIN — FENTANYL CITRATE 25 MCG: 50 INJECTION, SOLUTION INTRAMUSCULAR; INTRAVENOUS at 13:28

## 2023-06-08 RX ADMIN — DEXMEDETOMIDINE HYDROCHLORIDE 4 MCG: 200 INJECTION INTRAVENOUS at 11:58

## 2023-06-08 RX ADMIN — ONDANSETRON 4 MG: 2 INJECTION INTRAMUSCULAR; INTRAVENOUS at 10:00

## 2023-06-08 RX ADMIN — MIDAZOLAM 2 MG: 1 INJECTION INTRAMUSCULAR; INTRAVENOUS at 09:44

## 2023-06-08 RX ADMIN — DEXMEDETOMIDINE HYDROCHLORIDE 8 MCG: 200 INJECTION INTRAVENOUS at 10:15

## 2023-06-08 RX ADMIN — HYDROMORPHONE HYDROCHLORIDE 0.5 MG: 1 INJECTION, SOLUTION INTRAMUSCULAR; INTRAVENOUS; SUBCUTANEOUS at 12:30

## 2023-06-08 RX ADMIN — Medication 0.5 UNITS: at 10:10

## 2023-06-08 RX ADMIN — PROPOFOL 30 MCG/KG/MIN: 10 INJECTION, EMULSION INTRAVENOUS at 10:28

## 2023-06-08 RX ADMIN — PROPOFOL 200 MG: 10 INJECTION, EMULSION INTRAVENOUS at 09:52

## 2023-06-08 ASSESSMENT — ACTIVITIES OF DAILY LIVING (ADL)
ADLS_ACUITY_SCORE: 36

## 2023-06-08 NOTE — ANESTHESIA PROCEDURE NOTES
Airway       Patient location during procedure: OR       Procedure Start/Stop Times: 6/8/2023 9:55 AM  Staff -        Anesthesiologist:  Ruperto Kessler MD       CRNA: Beltran Schultz APRN CRNA       Performed By: CRNA  Consent for Airway        Urgency: elective  Indications and Patient Condition       Indications for airway management: ricardo-procedural       Induction type:intravenous       Mask difficulty assessment: 2 - vent by mask + OA or adjuvant +/- NMBA    Final Airway Details       Final airway type: endotracheal airway       Successful airway: ETT - single  Endotracheal Airway Details        ETT size (mm): 8.0       Cuffed: yes       Successful intubation technique: direct laryngoscopy and video laryngoscopy       Grade View of Cords: 1       Adjucts: stylet       Position: Right       Measured from: lips       Secured at (cm): 23       Bite block used: None    Post intubation assessment        Placement verified by: capnometry, equal breath sounds and chest rise        Number of attempts at approach: 1       Number of other approaches attempted: 0       Secured with: pink tape       Ease of procedure: easy       Dentition: Intact and Unchanged    Medication(s) Administered   Medication Administration Time: 6/8/2023 9:55 AM

## 2023-06-08 NOTE — DISCHARGE INSTRUCTIONS
Today you were given 975 mg of Tylenol at 8:30 am. The recommended daily maximum dose is 4000 mg.      Same Day Surgery Discharge Instructions for  Sedation and General Anesthesia     It's not unusual to feel dizzy, light-headed or faint for up to 24 hours after surgery or while taking pain medication.  If you have these symptoms: sit for a few minutes before standing and have someone assist you when you get up to walk or use the bathroom.    You should rest and relax for the next 24 hours. We recommend you make arrangements to have an adult stay with you for at least 24 hours after your discharge.  Avoid hazardous and strenuous activity.    DO NOT DRIVE any vehicle or operate mechanical equipment for 24 hours following the end of your surgery.  Even though you may feel normal, your reactions may be affected by the medication you have received.    Do not drink alcoholic beverages for 24 hours following surgery.     Slowly progress to your regular diet as you feel able. It's not unusual to feel nauseated and/or vomit after receiving anesthesia.  If you develop these symptoms, drink clear liquids (apple juice, ginger ale, broth, 7-up, etc. ) until you feel better.  If your nausea and vomiting persists for 24 hours, please notify your surgeon.      All narcotic pain medications, along with inactivity and anesthesia, can cause constipation. Drinking plenty of liquids and increasing fiber intake will help.    For any questions of a medical nature, call your surgeon.    Do not make important decisions for 24 hours.    If you had general anesthesia, you may have a sore throat for a couple of days related to the breathing tube used during surgery.  You may use Cepacol lozenges to help with this discomfort.  If it worsens or if you develop a fever, contact your surgeon.     If you feel your pain is not well managed with the pain medications prescribed by your surgeon, please contact your surgeon's office to let them know so  they can address your concerns.        [facility]   Discharge Instructions   Sandra Jj M.D.      PAIN  You may have numbing medication in you incisional area.  As this medication wears off you can take the pills prescribed for you.   Elevate your arm for the first 24 hours to reduce swelling and pain.  Use ice as needed.    DRESSINGS  Keep your dressing dry and intact as instructed. Cover with plastic wrap or a plastic bag to shower.   Trigger finger release: may change dressings in 3 days, cover incision with band aid.  Endoscopic Carpal tunnel release: may change dressing in 3 days.  Cover incision with band aid.      ACTIVITIES  After the surgery, begin moving your fingers immediately.  You may use your hand for light activities and driving.   You may also return to work for light duty or as discussed with your doctor.  No heavy lifting, pushing, or pulling, with your surgical hand.    FOLLOW-UP  If a follow-up appointment was not made for you, call the office, as soon as possible, after your surgery to schedule a follow up appointment for 10-14 days post-operatively.     [facility] patients:  you can be seen at the [place] office on Mondays or Wednesdays:  [address], [place] [address]          [facility] patients:  you can be seen at the [place] office on Tuesdays or Thursdays at:   [address] [place] [address]    Call [phone] for your follow-up appointment.     CALL OUR OFFICE, AT [phone] IF:  You develop a fever (101   or above).  You develop redness, swelling or drainage around the incisional area.  You have problems such as rash or continued nausea and/or vomiting with medication.  You have any questions, problems or concerns.      **For emergencies after 5 pm, call the on-call physician at [phone]**                   **If you have questions or concerns about your procedure,  call Dr. Jj at  [phone]**    POST-OP DIAGNOSIS:  Mitral regurgitation 14-Sep-2022 13:01:19  Damion Holt

## 2023-06-08 NOTE — ANESTHESIA CARE TRANSFER NOTE
Patient: Liban Nava    Procedure: Procedure(s):  Left Wrist Scaphold Excision, Four Corner Fusion       Diagnosis: Arthritis [M19.90]  Diagnosis Additional Information: No value filed.    Anesthesia Type:   General     Note:    Oropharynx: oropharynx clear of all foreign objects and spontaneously breathing  Level of Consciousness: drowsy  Oxygen Supplementation: face mask  Level of Supplemental Oxygen (L/min / FiO2): 6  Independent Airway: airway patency satisfactory and stable  Dentition: dentition unchanged  Vital Signs Stable: post-procedure vital signs reviewed and stable  Report to RN Given: handoff report given  Patient transferred to: Phase II    Handoff Report: Identifed the Patient, Identified the Reponsible Provider, Reviewed the pertinent medical history, Discussed the surgical course, Reviewed Intra-OP anesthesia mangement and issues during anesthesia, Set expectations for post-procedure period and Allowed opportunity for questions and acknowledgement of understanding      Vitals:  Vitals Value Taken Time   /86 06/08/23 1234   Temp 98.2    Pulse 95 06/08/23 1236   Resp 10 06/08/23 1236   SpO2 98 % 06/08/23 1236   Vitals shown include unvalidated device data.    Electronically Signed By: MELANIA Boudreaux CRNA  June 8, 2023  12:37 PM

## 2023-06-08 NOTE — INTERVAL H&P NOTE
I have reviewed the surgical (or preoperative) H&P that is linked to this encounter, and examined the patient. Noted changes include: - left upper extremity median neuropathy.

## 2023-06-09 NOTE — OP NOTE
Op Note    Diagnosis: Left SLAC wrist arthritis, stage III    Postop diagnosis: Same    Procedure: Scaphoid excision 4 corner fusion (Bette EZ clip staples, allograft bone)    Surgeon: Charleen Beasley    Anesthesia: General    Indication:    Mr. Nava has progressive pain related to wrist arthritis.  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  He has failed conservative treatment with limited response to injection and surgery was recommended.  The nature of surgery and recovery was reviewed expectations regarding range of motion and pain control discussed.  He is under the care of a pain clinic postoperative pain management was reviewed with his physician preoperatively and authorization for oxycodone use was obtained    Procedure:    The patient was brought to the operating room.  General anesthesia was induced without difficulty.  His left upper extremity was prepped and draped in usual sterile fashion.  A pause was performed to confirm the site and the procedure planned.    The limb was exsanguinated with an Esmarch and the tourniquet was inflated 250 mmHg.  A longitudinal incision was made in the distal forearm and wrist.  Skin and subcutaneous tissues were divided full-thickness flaps were elevated and the extensor retinaculum was exposed from the second through fifth compartments.  A longitudinal capsulotomy was performed and flaps were elevated radially and ulnarly protecting the cutaneous nerve branches and margins of the exposure.  The tendons were safely retracted a posterior interosseous neurectomy was performed.    A defect was noted in the capsule overlying the scaphoid.  The capsule was quite degenerative in this area and bony prominence was noted.  The defect was extended in a ligament sparing arthrotomy.  The capsule was retracted distally and the scaphoid was exposed.  A pin was placed in the scaphoid to act as a joystick  and using a combination of sharp dissection and an elevator the scaphoid was elevated ultimately to remove the distal pole the scaphoid was split at the waist with an osteotome and removed.  Therapy at this point the tourniquet was deflated to evaluate the radial artery branches to the scaphoid there was bleeding and after several attempts to cauterize the branches to the scaphoid separate incision was made to visualize the artery proper at the level of the snuffbox.  Care was taken to identify and preserve the dorsal vein as well as the sensory branches of the radial nerve.  The artery was identified the branch was then ligated and bleeding was then controlled nicely.    The midcarpal joint was then inspected.  It was identified preoperatively with full-thickness articular cartilage changes noted at the lunate and capitate articulation.  The proximal lunate articular surface and the radial lunate fossa was remained in good condition.  The midcarpal joint was then prepared using a small bur and a series of curettes to healthy cannulated cancellous bone.  The interspace was then packed with morselized allograft bone.  The pin was placed in the 0.045 K wire was placed in the lunate and the radial the start of the lunocapitate joint was aligned and provisionally held with a 0.045 K wire plate passed obliquely x-rays demonstrate satisfactory carpal alignment with correction of the DISI posture of the lunate.      Once alignment was confirmed, easy clip staples were th then placed to stabilize the construct.  2 15 mm staples were placed in the capitolunate joint.  A single 12 mm staple was placed at the triquetral hamate joint.  Additional bone graft was then placed in the dorsal margins of the joint the wound was then at the dorsal margin of the fusion site.  The wound was then irrigated thoroughly and the capsule was carefully repaired with 3-0 Vicryl suture.  The tourniquet was deflated and hemostasis was assured with  pressure and bipolar cautery and with excellent hemostasis the skin was then closed with Monocryl and Prolene suture.  A bulky sterile dressing was applied incorporating a volar plaster splint maintaining the wrist in slight ulnar deviation.  He tolerated the procedure well and was taken to the recovery room in good conditionen placed to stabilizing construct.  215 mm staples were placed in the capital lunate joint and a single 12 mm staple in the triquetral hamate joint all achieved excellent purchase x-rays demonstrate satisfactory alignment.

## 2023-10-15 ENCOUNTER — HEALTH MAINTENANCE LETTER (OUTPATIENT)
Age: 63
End: 2023-10-15

## 2024-03-03 ENCOUNTER — HEALTH MAINTENANCE LETTER (OUTPATIENT)
Age: 64
End: 2024-03-03

## 2025-03-15 ENCOUNTER — HEALTH MAINTENANCE LETTER (OUTPATIENT)
Age: 65
End: 2025-03-15

## (undated) DEVICE — BLADE KNIFE SURG 15 371115

## (undated) DEVICE — DRAPE STERI TOWEL LG 1010

## (undated) DEVICE — BUR ROUND 2MM CARBIDE LONG 5092-224

## (undated) DEVICE — DECANTER BAG 2002S

## (undated) DEVICE — DRAPE IOBAN INCISE 23X17" 6650EZ

## (undated) DEVICE — 2.0 DRILL BIT

## (undated) DEVICE — BNDG ELASTIC 4"X5YDS UNSTERILE 6611-40

## (undated) DEVICE — Device

## (undated) DEVICE — DRSG KERLIX FLUFFS X5

## (undated) DEVICE — PIN GUARD 10-1-001 101001PBX

## (undated) DEVICE — SU PROLENE 4-0 PC-3 18" 8634G

## (undated) DEVICE — CAST PLASTER SPLINT 4X15" 7394

## (undated) DEVICE — PACK HAND WRIST SOP15HWFSP

## (undated) DEVICE — DRSG STERI STRIP 1/2X4" R1547

## (undated) DEVICE — SPONGE BALL KERLIX ROUND XL W/O STRING LATEX 4935

## (undated) DEVICE — PREP DURAPREP 26ML APL 8630

## (undated) DEVICE — LINEN TOWEL PACK X5 5464

## (undated) DEVICE — SOL NACL 0.9% IRRIG 1000ML BOTTLE 2F7124

## (undated) DEVICE — SU PROLENE 6-0 RB-2DA 30" 8711H

## (undated) DEVICE — SOL ADH LIQUID BENZOIN SWAB 0.6ML C1544

## (undated) DEVICE — SU VICRYL 3-0 RB-1 27" UND J215H

## (undated) DEVICE — DRAPE MINI C-ARM 4003

## (undated) DEVICE — SU MONOCRYL 4-0 PC-3 18" UND Y845G

## (undated) DEVICE — SOL WATER IRRIG 1000ML BOTTLE 2F7114

## (undated) DEVICE — SPONGE RAY-TEC 4X8" 7318

## (undated) RX ORDER — HYDROMORPHONE HYDROCHLORIDE 1 MG/ML
INJECTION, SOLUTION INTRAMUSCULAR; INTRAVENOUS; SUBCUTANEOUS
Status: DISPENSED
Start: 2023-06-08

## (undated) RX ORDER — FENTANYL CITRATE 50 UG/ML
INJECTION, SOLUTION INTRAMUSCULAR; INTRAVENOUS
Status: DISPENSED
Start: 2023-06-08

## (undated) RX ORDER — FENTANYL CITRATE 0.05 MG/ML
INJECTION, SOLUTION INTRAMUSCULAR; INTRAVENOUS
Status: DISPENSED
Start: 2023-06-08

## (undated) RX ORDER — PROPOFOL 10 MG/ML
INJECTION, EMULSION INTRAVENOUS
Status: DISPENSED
Start: 2023-06-08

## (undated) RX ORDER — CLINDAMYCIN PHOSPHATE 900 MG/50ML
INJECTION, SOLUTION INTRAVENOUS
Status: DISPENSED
Start: 2023-06-08

## (undated) RX ORDER — DEXAMETHASONE SODIUM PHOSPHATE 4 MG/ML
INJECTION, SOLUTION INTRA-ARTICULAR; INTRALESIONAL; INTRAMUSCULAR; INTRAVENOUS; SOFT TISSUE
Status: DISPENSED
Start: 2023-06-08

## (undated) RX ORDER — ACETAMINOPHEN 325 MG/1
TABLET ORAL
Status: DISPENSED
Start: 2023-06-08

## (undated) RX ORDER — ONDANSETRON 2 MG/ML
INJECTION INTRAMUSCULAR; INTRAVENOUS
Status: DISPENSED
Start: 2023-06-08

## (undated) RX ORDER — OXYCODONE HYDROCHLORIDE 5 MG/1
TABLET ORAL
Status: DISPENSED
Start: 2023-06-08

## (undated) RX ORDER — BUPIVACAINE HYDROCHLORIDE 5 MG/ML
INJECTION, SOLUTION EPIDURAL; INTRACAUDAL
Status: DISPENSED
Start: 2023-06-08